# Patient Record
Sex: FEMALE | Race: ASIAN | NOT HISPANIC OR LATINO
[De-identification: names, ages, dates, MRNs, and addresses within clinical notes are randomized per-mention and may not be internally consistent; named-entity substitution may affect disease eponyms.]

---

## 2023-07-03 PROBLEM — Z00.00 ENCOUNTER FOR PREVENTIVE HEALTH EXAMINATION: Status: ACTIVE | Noted: 2023-07-03

## 2023-07-07 ENCOUNTER — APPOINTMENT (OUTPATIENT)
Dept: OBGYN | Facility: CLINIC | Age: 23
End: 2023-07-07

## 2023-07-12 ENCOUNTER — APPOINTMENT (OUTPATIENT)
Dept: OBGYN | Facility: CLINIC | Age: 23
End: 2023-07-12
Payer: MEDICAID

## 2023-07-12 VITALS
SYSTOLIC BLOOD PRESSURE: 110 MMHG | BODY MASS INDEX: 23.66 KG/M2 | TEMPERATURE: 97.5 F | DIASTOLIC BLOOD PRESSURE: 70 MMHG | HEIGHT: 65 IN | WEIGHT: 142 LBS | HEART RATE: 73 BPM | OXYGEN SATURATION: 97 %

## 2023-07-12 LAB
HCG UR QL: NEGATIVE
QUALITY CONTROL: YES

## 2023-07-12 PROCEDURE — 99203 OFFICE O/P NEW LOW 30 MIN: CPT

## 2023-07-13 ENCOUNTER — NON-APPOINTMENT (OUTPATIENT)
Age: 23
End: 2023-07-13

## 2023-07-13 ENCOUNTER — APPOINTMENT (OUTPATIENT)
Dept: OBGYN | Facility: CLINIC | Age: 23
End: 2023-07-13

## 2023-07-15 ENCOUNTER — TRANSCRIPTION ENCOUNTER (OUTPATIENT)
Age: 23
End: 2023-07-15

## 2023-07-17 ENCOUNTER — APPOINTMENT (OUTPATIENT)
Dept: GYNECOLOGIC ONCOLOGY | Facility: CLINIC | Age: 23
End: 2023-07-17
Payer: MEDICAID

## 2023-07-17 VITALS
DIASTOLIC BLOOD PRESSURE: 73 MMHG | HEIGHT: 69 IN | SYSTOLIC BLOOD PRESSURE: 117 MMHG | BODY MASS INDEX: 21.03 KG/M2 | HEART RATE: 87 BPM | WEIGHT: 142 LBS

## 2023-07-17 DIAGNOSIS — Z80.41 FAMILY HISTORY OF MALIGNANT NEOPLASM OF OVARY: ICD-10-CM

## 2023-07-17 PROCEDURE — 99205 OFFICE O/P NEW HI 60 MIN: CPT

## 2023-07-17 NOTE — DISCUSSION/SUMMARY
[FreeTextEntry1] : 23 year old with complex adnexal mass\par \par I discussed management options with Ms. ASCENCIO in detail.  We discussed that ovarian malignancy can only be diagnosed definitively after surgical excision.  Options of management include surgical excision versus continued close radiographic surveillance.  The CA-125 is normal.  Germ cell markers are normal. We discussed the limitations of tumor markers.  Risks of surgery include bleeding/blood transfusion/infection/injury to bowel/bladder/ureter/blood vessels.  Risks of observation include disease progression.  \par \par I answered her questions to her apparent satisfaction.  She would like to proceed with surgery.  The plan will be for exlap resection of affected ovary with possible staging.  Will review images from recent MRI with radiology.  Discussed rationale for open surgery given importance of intact specimen removal.  If not suspicious, can consider laparoscopic approach with controlled drainage of the mass.  Postoperative expectations and recovery were discussed in detail. Surgery will be scheduled in near future.

## 2023-07-17 NOTE — PHYSICAL EXAM
[Chaperone Present] : A chaperone was present in the examining room during all aspects of the physical examination [Abnormal] : Abdomen: Abnormal [Normal] : No focal neurologic defects observed [de-identified] : mass palpable inferior to umbilicus

## 2023-07-17 NOTE — HISTORY OF PRESENT ILLNESS
[FreeTextEntry1] : Referred by Dr. Franklin Herzog\par \par Ms. Sanchez is a virginal 23 year old female referred by Dr. Millicent Herzog, for a large pelvic mass.  She presented to Dr. Millicent Herzog with irregular menses.  Workup included a pelvic sonogram which revealed a large complex pelvic mass measuring 17.8 x 15cm and small ascites.  Further workup included a MRI pelvis with a 17.3cm complex solid cystic lesion, likely right adnexal in origin.\par \par She also saw Dr. Sheriff last week for consultation.  Tumor markers including , CEA, HCG, LDH, AFP, , inhibin , and ANUJ all normal.  \par \par Family hx of cancer- mother with ovarian cancer, genetic testing was negative, s/p debulking and chemotherapy diagnosed in 2017 ([facility])\par \par She has irregular bleeding.  She denies abdominal/pelvic pain, dyspnea or chest pain, nausea/vomiting, changes in bowel habits or urination, lower extremity edema or pain.  She denies all other associated signs and symptoms.  She is here to discuss further management.

## 2023-07-19 ENCOUNTER — OUTPATIENT (OUTPATIENT)
Dept: OUTPATIENT SERVICES | Facility: HOSPITAL | Age: 23
LOS: 1 days | End: 2023-07-19

## 2023-07-19 VITALS
TEMPERATURE: 99 F | RESPIRATION RATE: 16 BRPM | HEART RATE: 76 BPM | OXYGEN SATURATION: 99 % | HEIGHT: 69 IN | SYSTOLIC BLOOD PRESSURE: 121 MMHG | WEIGHT: 145.06 LBS | DIASTOLIC BLOOD PRESSURE: 77 MMHG

## 2023-07-19 DIAGNOSIS — N83.299 OTHER OVARIAN CYST, UNSPECIFIED SIDE: ICD-10-CM

## 2023-07-19 LAB
ANION GAP SERPL CALC-SCNC: 11 MMOL/L — SIGNIFICANT CHANGE UP (ref 7–14)
BLD GP AB SCN SERPL QL: NEGATIVE — SIGNIFICANT CHANGE UP
BUN SERPL-MCNC: 13 MG/DL — SIGNIFICANT CHANGE UP (ref 7–23)
CALCIUM SERPL-MCNC: 9.9 MG/DL — SIGNIFICANT CHANGE UP (ref 8.4–10.5)
CHLORIDE SERPL-SCNC: 100 MMOL/L — SIGNIFICANT CHANGE UP (ref 98–107)
CO2 SERPL-SCNC: 28 MMOL/L — SIGNIFICANT CHANGE UP (ref 22–31)
CREAT SERPL-MCNC: 0.69 MG/DL — SIGNIFICANT CHANGE UP (ref 0.5–1.3)
EGFR: 127 ML/MIN/1.73M2 — SIGNIFICANT CHANGE UP
GLUCOSE SERPL-MCNC: 98 MG/DL — SIGNIFICANT CHANGE UP (ref 70–99)
HCT VFR BLD CALC: 41.8 % — SIGNIFICANT CHANGE UP (ref 34.5–45)
HGB BLD-MCNC: 13.5 G/DL — SIGNIFICANT CHANGE UP (ref 11.5–15.5)
MCHC RBC-ENTMCNC: 30.6 PG — SIGNIFICANT CHANGE UP (ref 27–34)
MCHC RBC-ENTMCNC: 32.3 GM/DL — SIGNIFICANT CHANGE UP (ref 32–36)
MCV RBC AUTO: 94.8 FL — SIGNIFICANT CHANGE UP (ref 80–100)
NRBC # BLD: 0 /100 WBCS — SIGNIFICANT CHANGE UP (ref 0–0)
NRBC # FLD: 0 K/UL — SIGNIFICANT CHANGE UP (ref 0–0)
PLATELET # BLD AUTO: 275 K/UL — SIGNIFICANT CHANGE UP (ref 150–400)
POTASSIUM SERPL-MCNC: 4.2 MMOL/L — SIGNIFICANT CHANGE UP (ref 3.5–5.3)
POTASSIUM SERPL-SCNC: 4.2 MMOL/L — SIGNIFICANT CHANGE UP (ref 3.5–5.3)
RBC # BLD: 4.41 M/UL — SIGNIFICANT CHANGE UP (ref 3.8–5.2)
RBC # FLD: 11.3 % — SIGNIFICANT CHANGE UP (ref 10.3–14.5)
RH IG SCN BLD-IMP: POSITIVE — SIGNIFICANT CHANGE UP
SODIUM SERPL-SCNC: 139 MMOL/L — SIGNIFICANT CHANGE UP (ref 135–145)
WBC # BLD: 5.6 K/UL — SIGNIFICANT CHANGE UP (ref 3.8–10.5)
WBC # FLD AUTO: 5.6 K/UL — SIGNIFICANT CHANGE UP (ref 3.8–10.5)

## 2023-07-19 RX ORDER — SODIUM CHLORIDE 9 MG/ML
3 INJECTION INTRAMUSCULAR; INTRAVENOUS; SUBCUTANEOUS EVERY 8 HOURS
Refills: 0 | Status: DISCONTINUED | OUTPATIENT
Start: 2023-07-27 | End: 2023-07-29

## 2023-07-19 NOTE — H&P PST ADULT - NSICDXFAMILYHX_GEN_ALL_CORE_FT
FAMILY HISTORY:  Father  Still living? Unknown  FH: thyroid cancer, Age at diagnosis: Age Unknown    Mother  Still living? Unknown  FH: ovarian cancer, Age at diagnosis: Age Unknown

## 2023-07-19 NOTE — H&P PST ADULT - PROBLEM SELECTOR PLAN 1
Pre-op instructions provided. Pt verbalized understanding.   Pepcid provided for GI prophylaxis.   Pt given detailed verbal and written instructions on chlorhexidine wash. Pt verbalized understanding with teachback.   Pt given urine specimen cup for ucg on admission.   Recent HCG in chart, CBC, BMP, T&S done at PST.

## 2023-07-19 NOTE — H&P PST ADULT - HISTORY OF PRESENT ILLNESS
23 year old female with c/o irregular periods, found to have pelvic mass on u/s. Pt went for f/u MRI. Pt presents today for presurgical evaluation for... 23 year old female with c/o irregular periods, found to have pelvic mass on u/s. Pt went for f/u MRI. Pt presents today for presurgical evaluation for Exploratory Resection of Pelvic Mass, Possible Staging.

## 2023-07-26 ENCOUNTER — TRANSCRIPTION ENCOUNTER (OUTPATIENT)
Age: 23
End: 2023-07-26

## 2023-07-27 ENCOUNTER — RESULT REVIEW (OUTPATIENT)
Age: 23
End: 2023-07-27

## 2023-07-27 ENCOUNTER — INPATIENT (INPATIENT)
Facility: HOSPITAL | Age: 23
LOS: 1 days | Discharge: HOME | End: 2023-07-29
Attending: OBSTETRICS & GYNECOLOGY | Admitting: OBSTETRICS & GYNECOLOGY
Payer: MEDICAID

## 2023-07-27 ENCOUNTER — TRANSCRIPTION ENCOUNTER (OUTPATIENT)
Age: 23
End: 2023-07-27

## 2023-07-27 ENCOUNTER — APPOINTMENT (OUTPATIENT)
Dept: GYNECOLOGIC ONCOLOGY | Facility: HOSPITAL | Age: 23
End: 2023-07-27

## 2023-07-27 VITALS
WEIGHT: 145.06 LBS | DIASTOLIC BLOOD PRESSURE: 79 MMHG | SYSTOLIC BLOOD PRESSURE: 117 MMHG | OXYGEN SATURATION: 99 % | RESPIRATION RATE: 16 BRPM | HEIGHT: 69 IN | HEART RATE: 81 BPM | TEMPERATURE: 99 F

## 2023-07-27 DIAGNOSIS — N83.299 OTHER OVARIAN CYST, UNSPECIFIED SIDE: ICD-10-CM

## 2023-07-27 LAB
HCG UR QL: NEGATIVE — SIGNIFICANT CHANGE UP
RH IG SCN BLD-IMP: POSITIVE — SIGNIFICANT CHANGE UP

## 2023-07-27 PROCEDURE — 88342 IMHCHEM/IMCYTCHM 1ST ANTB: CPT | Mod: 26,59

## 2023-07-27 PROCEDURE — 88332 PATH CONSLTJ SURG EA ADD BLK: CPT | Mod: 26

## 2023-07-27 PROCEDURE — 88341 IMHCHEM/IMCYTCHM EA ADD ANTB: CPT | Mod: 26,59

## 2023-07-27 PROCEDURE — 88305 TISSUE EXAM BY PATHOLOGIST: CPT | Mod: 26

## 2023-07-27 PROCEDURE — 88360 TUMOR IMMUNOHISTOCHEM/MANUAL: CPT | Mod: 26

## 2023-07-27 PROCEDURE — 88331 PATH CONSLTJ SURG 1 BLK 1SPC: CPT | Mod: 26

## 2023-07-27 PROCEDURE — 88112 CYTOPATH CELL ENHANCE TECH: CPT | Mod: 26

## 2023-07-27 PROCEDURE — 49205: CPT

## 2023-07-27 DEVICE — LIGATING CLIPS WECK HORIZON MEDIUM (BLUE) 24: Type: IMPLANTABLE DEVICE | Status: FUNCTIONAL

## 2023-07-27 DEVICE — SEPRAFILM 5 X 6": Type: IMPLANTABLE DEVICE | Status: FUNCTIONAL

## 2023-07-27 DEVICE — LIGATING CLIPS WECK HORIZON LARGE (ORANGE) 24: Type: IMPLANTABLE DEVICE | Status: FUNCTIONAL

## 2023-07-27 DEVICE — INTERCEED 5 X 6" XL: Type: IMPLANTABLE DEVICE | Status: FUNCTIONAL

## 2023-07-27 RX ORDER — SODIUM CHLORIDE 9 MG/ML
1000 INJECTION, SOLUTION INTRAVENOUS
Refills: 0 | Status: DISCONTINUED | OUTPATIENT
Start: 2023-07-27 | End: 2023-07-27

## 2023-07-27 RX ORDER — OXYCODONE HYDROCHLORIDE 5 MG/1
5 TABLET ORAL EVERY 6 HOURS
Refills: 0 | Status: DISCONTINUED | OUTPATIENT
Start: 2023-07-27 | End: 2023-07-29

## 2023-07-27 RX ORDER — OXYCODONE HYDROCHLORIDE 5 MG/1
2.5 TABLET ORAL EVERY 6 HOURS
Refills: 0 | Status: DISCONTINUED | OUTPATIENT
Start: 2023-07-27 | End: 2023-07-29

## 2023-07-27 RX ORDER — OXYCODONE HYDROCHLORIDE 5 MG/1
5 TABLET ORAL ONCE
Refills: 0 | Status: DISCONTINUED | OUTPATIENT
Start: 2023-07-27 | End: 2023-07-27

## 2023-07-27 RX ORDER — SENNA PLUS 8.6 MG/1
2 TABLET ORAL AT BEDTIME
Refills: 0 | Status: DISCONTINUED | OUTPATIENT
Start: 2023-07-27 | End: 2023-07-29

## 2023-07-27 RX ORDER — SIMETHICONE 80 MG/1
80 TABLET, CHEWABLE ORAL EVERY 6 HOURS
Refills: 0 | Status: DISCONTINUED | OUTPATIENT
Start: 2023-07-27 | End: 2023-07-28

## 2023-07-27 RX ORDER — KETOROLAC TROMETHAMINE 30 MG/ML
30 SYRINGE (ML) INJECTION EVERY 6 HOURS
Refills: 0 | Status: DISCONTINUED | OUTPATIENT
Start: 2023-07-27 | End: 2023-07-28

## 2023-07-27 RX ORDER — ONDANSETRON 8 MG/1
4 TABLET, FILM COATED ORAL EVERY 4 HOURS
Refills: 0 | Status: DISCONTINUED | OUTPATIENT
Start: 2023-07-27 | End: 2023-07-29

## 2023-07-27 RX ORDER — FENTANYL CITRATE 50 UG/ML
50 INJECTION INTRAVENOUS
Refills: 0 | Status: DISCONTINUED | OUTPATIENT
Start: 2023-07-27 | End: 2023-07-27

## 2023-07-27 RX ORDER — HYDROMORPHONE HYDROCHLORIDE 2 MG/ML
1 INJECTION INTRAMUSCULAR; INTRAVENOUS; SUBCUTANEOUS
Refills: 0 | Status: DISCONTINUED | OUTPATIENT
Start: 2023-07-27 | End: 2023-07-27

## 2023-07-27 RX ORDER — HEPARIN SODIUM 5000 [USP'U]/ML
5000 INJECTION INTRAVENOUS; SUBCUTANEOUS EVERY 8 HOURS
Refills: 0 | Status: DISCONTINUED | OUTPATIENT
Start: 2023-07-27 | End: 2023-07-28

## 2023-07-27 RX ORDER — SODIUM CHLORIDE 9 MG/ML
1000 INJECTION, SOLUTION INTRAVENOUS
Refills: 0 | Status: DISCONTINUED | OUTPATIENT
Start: 2023-07-27 | End: 2023-07-28

## 2023-07-27 RX ORDER — ACETAMINOPHEN 500 MG
1000 TABLET ORAL EVERY 6 HOURS
Refills: 0 | Status: DISCONTINUED | OUTPATIENT
Start: 2023-07-27 | End: 2023-07-28

## 2023-07-27 RX ORDER — ONDANSETRON 8 MG/1
4 TABLET, FILM COATED ORAL ONCE
Refills: 0 | Status: DISCONTINUED | OUTPATIENT
Start: 2023-07-27 | End: 2023-07-27

## 2023-07-27 RX ADMIN — SODIUM CHLORIDE 125 MILLILITER(S): 9 INJECTION, SOLUTION INTRAVENOUS at 11:31

## 2023-07-27 RX ADMIN — Medication 1000 MILLIGRAM(S): at 16:10

## 2023-07-27 RX ADMIN — Medication 1000 MILLIGRAM(S): at 22:35

## 2023-07-27 RX ADMIN — SENNA PLUS 2 TABLET(S): 8.6 TABLET ORAL at 22:05

## 2023-07-27 RX ADMIN — Medication 30 MILLIGRAM(S): at 15:59

## 2023-07-27 RX ADMIN — OXYCODONE HYDROCHLORIDE 5 MILLIGRAM(S): 5 TABLET ORAL at 20:27

## 2023-07-27 RX ADMIN — Medication 400 MILLIGRAM(S): at 15:37

## 2023-07-27 RX ADMIN — SODIUM CHLORIDE 125 MILLILITER(S): 9 INJECTION, SOLUTION INTRAVENOUS at 15:59

## 2023-07-27 RX ADMIN — OXYCODONE HYDROCHLORIDE 5 MILLIGRAM(S): 5 TABLET ORAL at 17:47

## 2023-07-27 RX ADMIN — HEPARIN SODIUM 5000 UNIT(S): 5000 INJECTION INTRAVENOUS; SUBCUTANEOUS at 16:00

## 2023-07-27 RX ADMIN — HEPARIN SODIUM 5000 UNIT(S): 5000 INJECTION INTRAVENOUS; SUBCUTANEOUS at 22:05

## 2023-07-27 RX ADMIN — Medication 30 MILLIGRAM(S): at 23:15

## 2023-07-27 RX ADMIN — SODIUM CHLORIDE 3 MILLILITER(S): 9 INJECTION INTRAMUSCULAR; INTRAVENOUS; SUBCUTANEOUS at 22:49

## 2023-07-27 RX ADMIN — Medication 30 MILLIGRAM(S): at 22:46

## 2023-07-27 RX ADMIN — Medication 400 MILLIGRAM(S): at 22:05

## 2023-07-27 RX ADMIN — OXYCODONE HYDROCHLORIDE 5 MILLIGRAM(S): 5 TABLET ORAL at 21:27

## 2023-07-27 RX ADMIN — OXYCODONE HYDROCHLORIDE 5 MILLIGRAM(S): 5 TABLET ORAL at 16:47

## 2023-07-27 RX ADMIN — SODIUM CHLORIDE 3 MILLILITER(S): 9 INJECTION INTRAMUSCULAR; INTRAVENOUS; SUBCUTANEOUS at 14:12

## 2023-07-27 NOTE — BRIEF OPERATIVE NOTE - NSICDXBRIEFPROCEDURE_GEN_ALL_CORE_FT
PROCEDURES:  Right salpingoophorectomy 27-Jul-2023 10:46:30 Ex Lap Kaley Gomez  Pelvic examination under anesthesia 27-Jul-2023 10:46:47  Kaley Gomez

## 2023-07-27 NOTE — PATIENT PROFILE ADULT - FALL HARM RISK - HARM RISK INTERVENTIONS

## 2023-07-27 NOTE — BRIEF OPERATIVE NOTE - OPERATION/FINDINGS
Normal ecte Normal external female genitalia, uterus could not be appreciated. Large mass appreciated 2 cm under the level of the umbilicus   Upon entry into the abdomen there was a large cystic mass arising from the right adnexa. Normal appearing uterus, bilateral fallopian tubes and left ovary. Ascites collected and sent to cytology. Appendix, liver edge and omentum wnl.   Frozen= mucinous border line 2 Solid components noted   Interceed and Seprafilm used Normal external female genitalia, uterus could not be appreciated. Large mass appreciated to the level of the umbilicus   Upon entry into the abdomen there was a large cystic mass arising from the right adnexa. Normal appearing uterus, bilateral fallopian tubes and left ovary. Ascites collected and sent to cytology. Appendix, liver edge and omentum wnl.   Frozen= mucinous border line 2 Solid components noted   Interceed and Seprafilm used

## 2023-07-28 ENCOUNTER — TRANSCRIPTION ENCOUNTER (OUTPATIENT)
Age: 23
End: 2023-07-28

## 2023-07-28 DIAGNOSIS — Z98.890 OTHER SPECIFIED POSTPROCEDURAL STATES: ICD-10-CM

## 2023-07-28 LAB
ANION GAP SERPL CALC-SCNC: 8 MMOL/L — SIGNIFICANT CHANGE UP (ref 7–14)
BASOPHILS # BLD AUTO: 0.02 K/UL — SIGNIFICANT CHANGE UP (ref 0–0.2)
BASOPHILS NFR BLD AUTO: 0.2 % — SIGNIFICANT CHANGE UP (ref 0–2)
BUN SERPL-MCNC: 8 MG/DL — SIGNIFICANT CHANGE UP (ref 7–23)
CALCIUM SERPL-MCNC: 8.6 MG/DL — SIGNIFICANT CHANGE UP (ref 8.4–10.5)
CHLORIDE SERPL-SCNC: 104 MMOL/L — SIGNIFICANT CHANGE UP (ref 98–107)
CO2 SERPL-SCNC: 27 MMOL/L — SIGNIFICANT CHANGE UP (ref 22–31)
CREAT SERPL-MCNC: 0.57 MG/DL — SIGNIFICANT CHANGE UP (ref 0.5–1.3)
EGFR: 133 ML/MIN/1.73M2 — SIGNIFICANT CHANGE UP
EOSINOPHIL # BLD AUTO: 0.02 K/UL — SIGNIFICANT CHANGE UP (ref 0–0.5)
EOSINOPHIL NFR BLD AUTO: 0.2 % — SIGNIFICANT CHANGE UP (ref 0–6)
GLUCOSE SERPL-MCNC: 89 MG/DL — SIGNIFICANT CHANGE UP (ref 70–99)
HCT VFR BLD CALC: 35.3 % — SIGNIFICANT CHANGE UP (ref 34.5–45)
HGB BLD-MCNC: 11.5 G/DL — SIGNIFICANT CHANGE UP (ref 11.5–15.5)
IANC: 8.65 K/UL — HIGH (ref 1.8–7.4)
IMM GRANULOCYTES NFR BLD AUTO: 0.3 % — SIGNIFICANT CHANGE UP (ref 0–0.9)
LYMPHOCYTES # BLD AUTO: 19.2 % — SIGNIFICANT CHANGE UP (ref 13–44)
LYMPHOCYTES # BLD AUTO: 2.28 K/UL — SIGNIFICANT CHANGE UP (ref 1–3.3)
MAGNESIUM SERPL-MCNC: 2 MG/DL — SIGNIFICANT CHANGE UP (ref 1.6–2.6)
MCHC RBC-ENTMCNC: 30.5 PG — SIGNIFICANT CHANGE UP (ref 27–34)
MCHC RBC-ENTMCNC: 32.6 GM/DL — SIGNIFICANT CHANGE UP (ref 32–36)
MCV RBC AUTO: 93.6 FL — SIGNIFICANT CHANGE UP (ref 80–100)
MONOCYTES # BLD AUTO: 0.9 K/UL — SIGNIFICANT CHANGE UP (ref 0–0.9)
MONOCYTES NFR BLD AUTO: 7.6 % — SIGNIFICANT CHANGE UP (ref 2–14)
NEUTROPHILS # BLD AUTO: 8.65 K/UL — HIGH (ref 1.8–7.4)
NEUTROPHILS NFR BLD AUTO: 72.5 % — SIGNIFICANT CHANGE UP (ref 43–77)
NRBC # BLD: 0 /100 WBCS — SIGNIFICANT CHANGE UP (ref 0–0)
NRBC # FLD: 0 K/UL — SIGNIFICANT CHANGE UP (ref 0–0)
PHOSPHATE SERPL-MCNC: 4.3 MG/DL — SIGNIFICANT CHANGE UP (ref 2.5–4.5)
PLATELET # BLD AUTO: 231 K/UL — SIGNIFICANT CHANGE UP (ref 150–400)
POTASSIUM SERPL-MCNC: 3.6 MMOL/L — SIGNIFICANT CHANGE UP (ref 3.5–5.3)
POTASSIUM SERPL-SCNC: 3.6 MMOL/L — SIGNIFICANT CHANGE UP (ref 3.5–5.3)
RBC # BLD: 3.77 M/UL — LOW (ref 3.8–5.2)
RBC # FLD: 11.6 % — SIGNIFICANT CHANGE UP (ref 10.3–14.5)
SODIUM SERPL-SCNC: 139 MMOL/L — SIGNIFICANT CHANGE UP (ref 135–145)
WBC # BLD: 11.9 K/UL — HIGH (ref 3.8–10.5)
WBC # FLD AUTO: 11.9 K/UL — HIGH (ref 3.8–10.5)

## 2023-07-28 RX ORDER — ACETAMINOPHEN 500 MG
975 TABLET ORAL EVERY 6 HOURS
Refills: 0 | Status: DISCONTINUED | OUTPATIENT
Start: 2023-07-28 | End: 2023-07-29

## 2023-07-28 RX ORDER — OXYCODONE HYDROCHLORIDE 5 MG/1
1 TABLET ORAL
Qty: 10 | Refills: 0
Start: 2023-07-28

## 2023-07-28 RX ORDER — IBUPROFEN 200 MG
600 TABLET ORAL EVERY 6 HOURS
Refills: 0 | Status: DISCONTINUED | OUTPATIENT
Start: 2023-07-28 | End: 2023-07-29

## 2023-07-28 RX ORDER — SIMETHICONE 80 MG/1
80 TABLET, CHEWABLE ORAL
Refills: 0 | Status: DISCONTINUED | OUTPATIENT
Start: 2023-07-28 | End: 2023-07-29

## 2023-07-28 RX ORDER — ENOXAPARIN SODIUM 100 MG/ML
40 INJECTION SUBCUTANEOUS EVERY 24 HOURS
Refills: 0 | Status: DISCONTINUED | OUTPATIENT
Start: 2023-07-28 | End: 2023-07-29

## 2023-07-28 RX ORDER — POTASSIUM CHLORIDE 20 MEQ
40 PACKET (EA) ORAL EVERY 4 HOURS
Refills: 0 | Status: COMPLETED | OUTPATIENT
Start: 2023-07-28 | End: 2023-07-28

## 2023-07-28 RX ORDER — OXYCODONE HYDROCHLORIDE 5 MG/1
1 TABLET ORAL
Qty: 5 | Refills: 0
Start: 2023-07-28

## 2023-07-28 RX ADMIN — SODIUM CHLORIDE 3 MILLILITER(S): 9 INJECTION INTRAMUSCULAR; INTRAVENOUS; SUBCUTANEOUS at 21:39

## 2023-07-28 RX ADMIN — ENOXAPARIN SODIUM 40 MILLIGRAM(S): 100 INJECTION SUBCUTANEOUS at 13:40

## 2023-07-28 RX ADMIN — OXYCODONE HYDROCHLORIDE 5 MILLIGRAM(S): 5 TABLET ORAL at 13:40

## 2023-07-28 RX ADMIN — Medication 600 MILLIGRAM(S): at 22:51

## 2023-07-28 RX ADMIN — Medication 40 MILLIEQUIVALENT(S): at 10:45

## 2023-07-28 RX ADMIN — Medication 30 MILLIGRAM(S): at 17:10

## 2023-07-28 RX ADMIN — Medication 30 MILLIGRAM(S): at 05:33

## 2023-07-28 RX ADMIN — OXYCODONE HYDROCHLORIDE 5 MILLIGRAM(S): 5 TABLET ORAL at 08:30

## 2023-07-28 RX ADMIN — Medication 40 MILLIEQUIVALENT(S): at 13:40

## 2023-07-28 RX ADMIN — SODIUM CHLORIDE 3 MILLILITER(S): 9 INJECTION INTRAMUSCULAR; INTRAVENOUS; SUBCUTANEOUS at 13:31

## 2023-07-28 RX ADMIN — OXYCODONE HYDROCHLORIDE 5 MILLIGRAM(S): 5 TABLET ORAL at 20:46

## 2023-07-28 RX ADMIN — OXYCODONE HYDROCHLORIDE 5 MILLIGRAM(S): 5 TABLET ORAL at 14:30

## 2023-07-28 RX ADMIN — Medication 400 MILLIGRAM(S): at 03:57

## 2023-07-28 RX ADMIN — HEPARIN SODIUM 5000 UNIT(S): 5000 INJECTION INTRAVENOUS; SUBCUTANEOUS at 05:34

## 2023-07-28 RX ADMIN — OXYCODONE HYDROCHLORIDE 5 MILLIGRAM(S): 5 TABLET ORAL at 19:46

## 2023-07-28 RX ADMIN — SIMETHICONE 80 MILLIGRAM(S): 80 TABLET, CHEWABLE ORAL at 08:45

## 2023-07-28 RX ADMIN — Medication 975 MILLIGRAM(S): at 12:32

## 2023-07-28 RX ADMIN — Medication 30 MILLIGRAM(S): at 06:03

## 2023-07-28 RX ADMIN — Medication 975 MILLIGRAM(S): at 17:54

## 2023-07-28 RX ADMIN — SODIUM CHLORIDE 3 MILLILITER(S): 9 INJECTION INTRAMUSCULAR; INTRAVENOUS; SUBCUTANEOUS at 05:33

## 2023-07-28 RX ADMIN — Medication 1000 MILLIGRAM(S): at 04:30

## 2023-07-28 RX ADMIN — Medication 30 MILLIGRAM(S): at 11:10

## 2023-07-28 RX ADMIN — Medication 30 MILLIGRAM(S): at 10:45

## 2023-07-28 RX ADMIN — Medication 975 MILLIGRAM(S): at 12:44

## 2023-07-28 RX ADMIN — OXYCODONE HYDROCHLORIDE 5 MILLIGRAM(S): 5 TABLET ORAL at 07:33

## 2023-07-28 RX ADMIN — Medication 975 MILLIGRAM(S): at 18:25

## 2023-07-28 RX ADMIN — Medication 30 MILLIGRAM(S): at 17:57

## 2023-07-28 RX ADMIN — Medication 600 MILLIGRAM(S): at 21:51

## 2023-07-28 NOTE — DISCHARGE NOTE PROVIDER - HOSPITAL COURSE
23y yo F s/p Ex Lap Right Salpingo-Oophorectomy (Frozen=Mucinous Borderline)  on 7/27. See operative report for full details. EBL: 10 Hct:41.8->35.3    POD #0, pt was advanced to a regular diet  POD#1, hong was discontinued and pt was able to void spontaneously.     pt was discharged in stable condition, ambulating, tolerating po and voiding spontaneously. Pt to have close follow-up w/ Dr. Mendez in office 23y yo F s/p Ex Lap Right Salpingo-Oophorectomy (Frozen=Mucinous Borderline)  on 7/27. See operative report for full details. EBL: 10 Hct:41.8->35.3    POD #0, pt was advanced to a regular diet  POD#1, hong was discontinued and pt was able to void spontaneously.   POD #3 meeting all milestones postoperatively, passing flatus, tolerating regular diet, ambulating.     pt was discharged in stable condition, ambulating, tolerating po and voiding spontaneously. Pt to have close follow-up w/ Dr. Mendez in office

## 2023-07-28 NOTE — DISCHARGE NOTE PROVIDER - NSDCMRMEDTOKEN_GEN_ALL_CORE_FT
Motrin 600 mg oral tablet: 1 orally every 6 hours  oxyCODONE 5 mg oral tablet: 1 tab(s) orally every 6 hours MDD: 4 PILLS  Tylenol 325 mg oral tablet: 3 orally every 6 hours   Motrin 600 mg oral tablet: 1 orally every 6 hours  oxyCODONE 5 mg oral tablet: 1 tab(s) orally every 6 hours as needed for  severe pain MDD: 4 PILLS  Tylenol 325 mg oral tablet: 3 orally every 6 hours   ibuprofen 600 mg oral tablet: 1 tab(s) orally every 6 hours as needed for  moderate pain  oxyCODONE 5 mg oral tablet: 1 tab(s) orally every 6 hours as needed for  severe pain MDD: 4 PILLS  simethicone 80 mg oral tablet, chewable: 1 tab(s) orally 5 times a day as needed for gas relef  Tylenol 325 mg oral tablet: 3 tablet orally every 6 hours as needed for  mild pain

## 2023-07-28 NOTE — CHART NOTE - NSCHARTNOTEFT_GEN_A_CORE
PA GYN ONC PM Rounds    Patient seen at bedside.  No acute complaints.  Pain is controlled with medications. Patient walked the hallway and around the room a little. Patient is having difficulty with ambulation 2/2 to discomfort  Patient is tolerating regular diet, is voiding spontaneously, and is ambulating.  Patient has not passed flatus.  Plan is unchanged since this AM. Labs ordered for am    Vital Signs Last 24 Hrs  T(C): 36.8 (28 Jul 2023 13:52), Max: 37.3 (27 Jul 2023 17:02)  T(F): 98.2 (28 Jul 2023 13:52), Max: 99.2 (27 Jul 2023 17:02)  HR: 82 (28 Jul 2023 13:52) (76 - 91)  BP: 104/63 (28 Jul 2023 13:52) (104/63 - 110/69)  RR: 15 (28 Jul 2023 13:52) (15 - 20)  SpO2: 99% (28 Jul 2023 13:52) (96% - 99%)    Parameters below as of 28 Jul 2023 13:52  Patient On (Oxygen Delivery Method): room air      Radha Bolanos PAC  [id number]
PA POST OP NOTE:       SUBJECTIVE:  Patient seen and examined at bedside. Patient was asleep but aroused easily. Reports mild incisional discomfort at this time. Denies c/o nausea, vomiting, sob, cp or palpitations. Eliseo sips of water. Bergman in place.    Vital Signs Last 24 Hrs  T(C): 37 (27 Jul 2023 12:00), Max: 37.1 (27 Jul 2023 06:07)  T(F): 98.6 (27 Jul 2023 12:00), Max: 98.8 (27 Jul 2023 06:07)  HR: 80 (27 Jul 2023 12:30) (73 - 88)  BP: 97/67 (27 Jul 2023 12:30) (97/67 - 117/79)  BP(mean): 73 (27 Jul 2023 12:30) (67 - 76)  RR: 13 (27 Jul 2023 12:30) (12 - 16)  SpO2: 94% (27 Jul 2023 12:30) (94% - 100%)    Parameters below as of 27 Jul 2023 11:45  Patient On (Oxygen Delivery Method): room air          PHYSICAL EXAM:    LUNGS: Clear to auscultation bilaterally; No wheezing.  HEART: Regular, rate and rhythm; No murmurs.  ABDOMEN: Soft, decreased BS, nondistended and appropriately tender on palpation.  INCISION:  Vertical inc with dermabond dressing intact, clean and dry. Abdominal binder on.  EXTREMITIES: No swelling or calf tenderness bilaterally. Venodynes in place.  BERGMAN: Urine clear.         MEDICATIONS  (STANDING):  acetaminophen   IVPB .. 1000 milliGRAM(s) IV Intermittent every 6 hours  heparin   Injectable 5000 Unit(s) SubCutaneous every 8 hours  ketorolac   Injectable 30 milliGRAM(s) IV Push every 6 hours  lactated ringers. 1000 milliLiter(s) (125 mL/Hr) IV Continuous <Continuous>  sodium chloride 0.9% lock flush 3 milliLiter(s) IV Push every 8 hours    MEDICATIONS  (PRN):  fentaNYL    Injectable 50 MICROGram(s) IV Push every 5 minutes PRN Moderate Pain (4 - 6)  HYDROmorphone  Injectable 1 milliGRAM(s) IV Push every 10 minutes PRN Severe Pain (7 - 10)  ondansetron Injectable 4 milliGRAM(s) IV Push once PRN Nausea and/or Vomiting  ondansetron Injectable 4 milliGRAM(s) IV Push every 4 hours PRN Nausea and/or Vomiting  oxyCODONE    IR 5 milliGRAM(s) Oral every 6 hours PRN Severe Pain (7 - 10)  oxyCODONE    IR 2.5 milliGRAM(s) Oral every 6 hours PRN Moderate Pain (4 - 6)  simethicone 80 milliGRAM(s) Chew every 6 hours PRN Gas      ASSESMENT/PLAN: 22y/o POD#0  from Exlap RSO in stable condition.    1.Clears to Regular diet as tolerate.  2. IVF: RL @125cc/hr.  3. Activity: Out of bed with assist.  4. Vital Signs Q routine.  5. Pulm:  pulse Ox monitoring and encourage incentive spirometer use.  6.  Pain meds as ordered.  7. LABS: CBC+BMP in AM.  8. Bergman to gravity. Eval for removal after AM labs resulted.  9. Continue Heparin SQ and Venodynes for DVT prophylaxis.  10. Admit to floor and continue routine post op care.

## 2023-07-28 NOTE — PROGRESS NOTE ADULT - ASSESSMENT
Assessment/Plan: 23y POD#1, s/p Ex-Lap Right Salpingo-Oophorectomy (Frozen =Borderline) . Recovering well     Neuro:Pain controlled with IV Tylenol, Toradol and Oxycodone. Consider transitioning to PO pain medication  CV: Hemodynamically stable. F/u AM labs   Pulm: Saturating well on RA. Increase incentive spirometry.  GI: Regular diet, encourage PO intake   : Spicer in place. Adequate UOP. D/c Spicer after AM labs   Heme: Continue HSQ/Venodynes for DVT ppx. Increase OOB.  Consider switching to Lovenox   ID: Afebrile  Endo:No issues   FEN:LR@125 . F/u BMP Replete electrolytes PRN   Dispo: continue inpatient care    Kaley Gomez, PGY-4

## 2023-07-28 NOTE — DISCHARGE NOTE PROVIDER - CARE PROVIDERS DIRECT ADDRESSES
[email or web address]

## 2023-07-28 NOTE — DISCHARGE NOTE PROVIDER - NSDCCPTREATMENT_GEN_ALL_CORE_FT
PRINCIPAL PROCEDURE  Procedure: Right salpingoophorectomy  Findings and Treatment: Ex Lap      SECONDARY PROCEDURE  Procedure: Pelvic examination under anesthesia  Findings and Treatment:

## 2023-07-28 NOTE — DISCHARGE NOTE PROVIDER - NSDCFUADDINST_GEN_ALL_CORE_FT
Return to your regular way of eating.  Resume normal activity as tolerated, but no heavy lifting or strenuous activity for 6 weeks.  No driving for next 2 weeks and/or while on narcotic pain medication.  Complete vaginal rest, no tampons, no douching, no tub bathing, no sexual activities for 6 weeks unless otherwise instructed by your doctor.  Call your doctor with any signs and symptoms of infection such as fever, chills, nausea or vomiting.  Call your doctor with redness or swelling at the incision site, fluid leakage or wound separation.  Call your doctor if you're unable to tolerate food or have difficulty urinating.  Call your doctor if you have pain that is not relieved by your prescribed medications.  Notify your doctor with any other concerns.  Follow up with Dr. Mendez on 8/11 for postoperative visit

## 2023-07-28 NOTE — DISCHARGE NOTE PROVIDER - CARE PROVIDER_API CALL
Tena Mendez  Gynecologic Oncology  [address] [place] [address]  Phone: [phone]  Fax: [phone]  Established Patient  Scheduled Appointment: 08/11/2023   Tena Mendez  Gynecologic Oncology  [address] [place] [address]  Phone: [phone]  Fax: [phone]  Established Patient  Scheduled Appointment: 08/11/2023   Tena Mendez  Gynecologic Oncology  [address] [place] [address]  Phone: [phone]  Fax: [phone]  Established Patient  Scheduled Appointment: 08/11/2023

## 2023-07-29 ENCOUNTER — TRANSCRIPTION ENCOUNTER (OUTPATIENT)
Age: 23
End: 2023-07-29

## 2023-07-29 VITALS — TEMPERATURE: 98 F

## 2023-07-29 LAB
ANION GAP SERPL CALC-SCNC: 16 MMOL/L — HIGH (ref 7–14)
BASOPHILS # BLD AUTO: 0.01 K/UL — SIGNIFICANT CHANGE UP (ref 0–0.2)
BASOPHILS NFR BLD AUTO: 0.1 % — SIGNIFICANT CHANGE UP (ref 0–2)
BUN SERPL-MCNC: 7 MG/DL — SIGNIFICANT CHANGE UP (ref 7–23)
CALCIUM SERPL-MCNC: 8.9 MG/DL — SIGNIFICANT CHANGE UP (ref 8.4–10.5)
CHLORIDE SERPL-SCNC: 103 MMOL/L — SIGNIFICANT CHANGE UP (ref 98–107)
CO2 SERPL-SCNC: 22 MMOL/L — SIGNIFICANT CHANGE UP (ref 22–31)
CREAT SERPL-MCNC: 0.56 MG/DL — SIGNIFICANT CHANGE UP (ref 0.5–1.3)
EGFR: 133 ML/MIN/1.73M2 — SIGNIFICANT CHANGE UP
EOSINOPHIL # BLD AUTO: 0.11 K/UL — SIGNIFICANT CHANGE UP (ref 0–0.5)
EOSINOPHIL NFR BLD AUTO: 1.4 % — SIGNIFICANT CHANGE UP (ref 0–6)
GLUCOSE SERPL-MCNC: 75 MG/DL — SIGNIFICANT CHANGE UP (ref 70–99)
HCT VFR BLD CALC: 34.7 % — SIGNIFICANT CHANGE UP (ref 34.5–45)
HGB BLD-MCNC: 11 G/DL — LOW (ref 11.5–15.5)
IANC: 4.63 K/UL — SIGNIFICANT CHANGE UP (ref 1.8–7.4)
IMM GRANULOCYTES NFR BLD AUTO: 0.3 % — SIGNIFICANT CHANGE UP (ref 0–0.9)
LYMPHOCYTES # BLD AUTO: 2.34 K/UL — SIGNIFICANT CHANGE UP (ref 1–3.3)
LYMPHOCYTES # BLD AUTO: 30.8 % — SIGNIFICANT CHANGE UP (ref 13–44)
MAGNESIUM SERPL-MCNC: 2 MG/DL — SIGNIFICANT CHANGE UP (ref 1.6–2.6)
MCHC RBC-ENTMCNC: 30.1 PG — SIGNIFICANT CHANGE UP (ref 27–34)
MCHC RBC-ENTMCNC: 31.7 GM/DL — LOW (ref 32–36)
MCV RBC AUTO: 94.8 FL — SIGNIFICANT CHANGE UP (ref 80–100)
MONOCYTES # BLD AUTO: 0.48 K/UL — SIGNIFICANT CHANGE UP (ref 0–0.9)
MONOCYTES NFR BLD AUTO: 6.3 % — SIGNIFICANT CHANGE UP (ref 2–14)
NEUTROPHILS # BLD AUTO: 4.63 K/UL — SIGNIFICANT CHANGE UP (ref 1.8–7.4)
NEUTROPHILS NFR BLD AUTO: 61.1 % — SIGNIFICANT CHANGE UP (ref 43–77)
NRBC # BLD: 0 /100 WBCS — SIGNIFICANT CHANGE UP (ref 0–0)
NRBC # FLD: 0 K/UL — SIGNIFICANT CHANGE UP (ref 0–0)
PHOSPHATE SERPL-MCNC: 4.1 MG/DL — SIGNIFICANT CHANGE UP (ref 2.5–4.5)
PLATELET # BLD AUTO: 214 K/UL — SIGNIFICANT CHANGE UP (ref 150–400)
POTASSIUM SERPL-MCNC: 4.1 MMOL/L — SIGNIFICANT CHANGE UP (ref 3.5–5.3)
POTASSIUM SERPL-SCNC: 4.1 MMOL/L — SIGNIFICANT CHANGE UP (ref 3.5–5.3)
RBC # BLD: 3.66 M/UL — LOW (ref 3.8–5.2)
RBC # FLD: 11.5 % — SIGNIFICANT CHANGE UP (ref 10.3–14.5)
SODIUM SERPL-SCNC: 141 MMOL/L — SIGNIFICANT CHANGE UP (ref 135–145)
WBC # BLD: 7.59 K/UL — SIGNIFICANT CHANGE UP (ref 3.8–10.5)
WBC # FLD AUTO: 7.59 K/UL — SIGNIFICANT CHANGE UP (ref 3.8–10.5)

## 2023-07-29 PROCEDURE — 99238 HOSP IP/OBS DSCHRG MGMT 30/<: CPT

## 2023-07-29 RX ORDER — SIMETHICONE 80 MG/1
1 TABLET, CHEWABLE ORAL
Qty: 0 | Refills: 0 | DISCHARGE
Start: 2023-07-29

## 2023-07-29 RX ORDER — ACETAMINOPHEN 500 MG
3 TABLET ORAL
Qty: 0 | Refills: 0 | DISCHARGE

## 2023-07-29 RX ORDER — IBUPROFEN 200 MG
1 TABLET ORAL
Qty: 0 | Refills: 0 | DISCHARGE

## 2023-07-29 RX ORDER — IBUPROFEN 200 MG
1 TABLET ORAL
Qty: 40 | Refills: 0
Start: 2023-07-29 | End: 2023-08-07

## 2023-07-29 RX ADMIN — Medication 975 MILLIGRAM(S): at 01:03

## 2023-07-29 RX ADMIN — Medication 975 MILLIGRAM(S): at 13:37

## 2023-07-29 RX ADMIN — Medication 975 MILLIGRAM(S): at 06:23

## 2023-07-29 RX ADMIN — SODIUM CHLORIDE 3 MILLILITER(S): 9 INJECTION INTRAMUSCULAR; INTRAVENOUS; SUBCUTANEOUS at 13:07

## 2023-07-29 RX ADMIN — OXYCODONE HYDROCHLORIDE 5 MILLIGRAM(S): 5 TABLET ORAL at 01:47

## 2023-07-29 RX ADMIN — Medication 600 MILLIGRAM(S): at 13:37

## 2023-07-29 RX ADMIN — Medication 600 MILLIGRAM(S): at 06:23

## 2023-07-29 RX ADMIN — SODIUM CHLORIDE 3 MILLILITER(S): 9 INJECTION INTRAMUSCULAR; INTRAVENOUS; SUBCUTANEOUS at 05:18

## 2023-07-29 RX ADMIN — OXYCODONE HYDROCHLORIDE 5 MILLIGRAM(S): 5 TABLET ORAL at 10:43

## 2023-07-29 RX ADMIN — OXYCODONE HYDROCHLORIDE 5 MILLIGRAM(S): 5 TABLET ORAL at 02:47

## 2023-07-29 RX ADMIN — Medication 600 MILLIGRAM(S): at 12:38

## 2023-07-29 RX ADMIN — OXYCODONE HYDROCHLORIDE 5 MILLIGRAM(S): 5 TABLET ORAL at 09:43

## 2023-07-29 RX ADMIN — Medication 975 MILLIGRAM(S): at 12:37

## 2023-07-29 RX ADMIN — Medication 600 MILLIGRAM(S): at 05:24

## 2023-07-29 RX ADMIN — Medication 975 MILLIGRAM(S): at 05:23

## 2023-07-29 RX ADMIN — Medication 975 MILLIGRAM(S): at 00:03

## 2023-07-29 NOTE — PROGRESS NOTE ADULT - SUBJECTIVE AND OBJECTIVE BOX
PA GynOnc Progress Note POD #2    Pt seen, examined at bedside and doing well this am. Pt states mild abdominal pain.  Pt denies fever, chills, chest pain, SOB, nausea, vomiting, lightheadedness, dizziness.  Pt states passing flatus,    T(F): 97.7 (07-29-23 @ 05:23), Max: 98.4 (07-28-23 @ 16:57)  HR: 73 (07-29-23 @ 05:23) (71 - 82)  BP: 105/67 (07-29-23 @ 05:23) (101/62 - 108/68)  RR: 16 (07-29-23 @ 05:23) (15 - 20)  SpO2: 99% (07-29-23 @ 05:23) (97% - 100%)    I&O's Detail    28 Jul 2023 07:01  -  29 Jul 2023 07:00  --------------------------------------------------------  IN:    IV PiggyBack: 1000 mL    Oral Fluid: 240 mL  Total IN: 1240 mL    OUT:    Indwelling Catheter - Urethral (mL): 240 mL    Voided (mL): 1400 mL  Total OUT: 1640 mL    Total NET: -400 mL    MEDICATIONS  (STANDING):  acetaminophen     Tablet .. 975 milliGRAM(s) Oral every 6 hours  enoxaparin Injectable 40 milliGRAM(s) SubCutaneous every 24 hours  ibuprofen  Tablet. 600 milliGRAM(s) Oral every 6 hours  senna 2 Tablet(s) Oral at bedtime  simethicone 80 milliGRAM(s) Chew five times a day  sodium chloride 0.9% lock flush 3 milliLiter(s) IV Push every 8 hours    MEDICATIONS  (PRN):  ondansetron Injectable 4 milliGRAM(s) IV Push every 4 hours PRN Nausea and/or Vomiting  oxyCODONE    IR 2.5 milliGRAM(s) Oral every 6 hours PRN Moderate Pain (4 - 6)  oxyCODONE    IR 5 milliGRAM(s) Oral every 6 hours PRN Severe Pain (7 - 10)      Physical Exam:  Constitutional: WDWN female, appears stated age  Psych: NAD AxOx3  Skin: no breakdowns noted, warm and dry  Chest: s1s2+, RRR, clear to auscultation bilaterally, no w/r/r    Abdomen: softly distended, no guarding, no rebound, + bowel sounds, some tenderness to touch  Incision site: vertical incision clean and dry with dermabond tape intact.  Abdominal binder in place  Extremities: no lower extremity edema or calf tenderness bilaterally; intermittent compression stockings in place     LABS:             11.0   7.59  )-----------( 214      ( 07-29 @ 05:30 )             34.7                11.5   11.90 )-----------( 231      ( 07-28 @ 05:25 )             35.3       07-29    141    |  103    |  7      ----------------------------<  75     4.1     |  22     |  0.56   07-28    139    |  104    |  8      ----------------------------<  89     3.6     |  27     |  0.57     Ca    8.9        29 Jul 2023 05:30  Ca    8.6        28 Jul 2023 05:25  Phos  4.1       07-29  Phos  4.3       07-28  Mg     2.00      07-29  Mg     2.00      07-28    Urinalysis Basic - ( 29 Jul 2023 05:30 )    Color: x / Appearance: x / SG: x / pH: x  Gluc: 75 mg/dL / Ketone: x  / Bili: x / Urobili: x   Blood: x / Protein: x / Nitrite: x   Leuk Esterase: x / RBC: x / WBC x   Sq Epi: x / Non Sq Epi: x / Bacteria: x      CAPILLARY BLOOD GLUCOSE    This 23y female, s/p exploratory laparotomy, right salpingooophorectomy, Frozen->borderline mucinous pt stable    CV: hemodynamically stable, H/H stable  PUL: adequate on RA  GI: tolerating regular diet   :  voiding with adequate output without dysuria  ID: afebrile, WBC stable  DVT prophylaxis: Lovenox, ambulation and SCDs when in bed  Pain Management: controlled on current regimen  d/w attending and fellow morning rounds  -electrolytes stable  -encourage ambulation and OOB to chair  -encourage incentive spirometry  eval for d/c home this afternoon      Radha Bolanos, PAC  #[id number] spectra
R4 Gyn ONC Progress Note POD#1  HD#2    Subjective:   Pt seen and examined at bedside. No events overnight. Patient with abdominal pain with some relief with pain medication.  Patient is not ambulating. Passing flatus. Has not eaten as of yet. Pt denies fever, chills, chest pain, SOB, nausea, vomiting, lightheadedness, dizziness.      Objective:  T(F): 98.3 (07-28-23 @ 05:31), Max: 99.2 (07-27-23 @ 17:02)  HR: 76 (07-28-23 @ 05:31) (73 - 91)  BP: 107/72 (07-28-23 @ 05:31) (97/67 - 125/68)  RR: 16 (07-28-23 @ 05:31) (12 - 18)  SpO2: 98% (07-28-23 @ 05:31) (93% - 100%)  Wt(kg): --  I&O's Summary    27 Jul 2023 07:01  -  28 Jul 2023 06:41  --------------------------------------------------------  IN: 2765 mL / OUT: 2550 mL / NET: 215 mL      CAPILLARY BLOOD GLUCOSE          MEDICATIONS  (STANDING):  acetaminophen   IVPB .. 1000 milliGRAM(s) IV Intermittent every 6 hours  heparin   Injectable 5000 Unit(s) SubCutaneous every 8 hours  ketorolac   Injectable 30 milliGRAM(s) IV Push every 6 hours  lactated ringers. 1000 milliLiter(s) (125 mL/Hr) IV Continuous <Continuous>  senna 2 Tablet(s) Oral at bedtime  sodium chloride 0.9% lock flush 3 milliLiter(s) IV Push every 8 hours    MEDICATIONS  (PRN):  ondansetron Injectable 4 milliGRAM(s) IV Push every 4 hours PRN Nausea and/or Vomiting  oxyCODONE    IR 5 milliGRAM(s) Oral every 6 hours PRN Severe Pain (7 - 10)  oxyCODONE    IR 2.5 milliGRAM(s) Oral every 6 hours PRN Moderate Pain (4 - 6)  simethicone 80 milliGRAM(s) Chew every 6 hours PRN Gas      Physical Exam:  Constitutional: NAD, A+O x3  CV: RR S1S2 no m/r/g  Lungs: CTA b/l, good air flow b/l  Abdomen: soft, non distended, appropriately-tender. no guarding, no rebound, normal bowel sounds  Incision: midline vertical skin incision clean, dry, intact, covered in dermabond prineo  Extremities: no lower extremity edema or calf tenderness bilaterally; venodynes in place    LABS:

## 2023-07-29 NOTE — DISCHARGE NOTE NURSING/CASE MANAGEMENT/SOCIAL WORK - PATIENT PORTAL LINK FT
You can access the FollowMyHealth Patient Portal offered by [facility] by registering at the following website: [email or web address]. By joining [employer]’s FollowMyHealth portal, you will also be able to view your health information using other applications (apps) compatible with our system.

## 2023-07-29 NOTE — DISCHARGE NOTE NURSING/CASE MANAGEMENT/SOCIAL WORK - NSDCPEFALRISK_GEN_ALL_CORE
For information on Fall & Injury Prevention, visit: [email or web address] OR  [email or web address] OR  https://www.cdc.gov/steadi/patient.html

## 2023-07-29 NOTE — PROGRESS NOTE ADULT - ATTENDING COMMENTS
patient is feeling a bit better, trying to void  continue pain meds for pain  tolerated clears  advance diet  lovenox/scds
24 yo exploratory laparotomy, right salpingooophorectomy (IOFS borderline mucinous), doing well and meeting postoperative milestones.    -AVSS, labs reviewed. Afebrile, no leukocytosis. Replete lytes as indicated.  -Hct stable and on lovenox ppx.    -Pain well controlled with oral pain medication regimen.  -Tolerating regular diet, passing flatus and +BM. Abdominal binder in situ.  -Ambulation and IS encouraged.  -Plan for disposition today, given patient meeting postoperative milestones.  -All questions answered to patients satisfaction.    Anastasiia De Guzman MD      Division of Gynecologic Oncology    Department of Obstetrics and Gynecology  [facility]    [facility]

## 2023-07-29 NOTE — DISCHARGE NOTE NURSING/CASE MANAGEMENT/SOCIAL WORK - NSDCPNINST_GEN_ALL_CORE
Call MD for fever greater than 100.4, nausea, vomiting, increased pain, pus drainage from incision, or go to ER.

## 2023-07-29 NOTE — PROGRESS NOTE ADULT - PROBLEM SELECTOR PLAN 1
GYN ONC Fellow Addendum:    Pt seen and examined at bedside. Agree with above. Pain well controlled. Tolerating reg diet. Voiding. Passing flatus. Ambulating    VS reviewed  Labs reviewed    Meeting all post op milestones  Continue current pain regimen  Tolerating reg diet  Encourage ambulation and IS use  Replete lytes prn  DVT ppx: Lovenox, venodynes  Dispo: eval for PM d/c     Shamika Peralta MD
Gyn Onc Fellow Addendum:    Pt seen and examined at bedside on AM rounds. Agree with above.    VS reviewed  Labs reviewed    Hemodynamically stable  PO pain meds   Reg diet   D/c hong, f/u trial of void  Encourage ambulation and IS use  Replete lytes prn  DVT ppx: Zandra, SQH   Dispo: cont inpt care     Denisse Solitario MD

## 2023-08-01 ENCOUNTER — NON-APPOINTMENT (OUTPATIENT)
Age: 23
End: 2023-08-01

## 2023-08-03 LAB
AFP-TM SERPL-MCNC: <1.8 NG/ML
CA 125 (LABCORP): 14.3 U/ML
CANCER AG125 SERPL-ACNC: 13 U/ML
CANCER AG19-9 SERPL-ACNC: <2 U/ML
CEA SERPL-MCNC: 0.9 NG/ML
ESTRADIOL SERPL-MCNC: 36 PG/ML
HCG SERPL-MCNC: <1 MIU/ML
HE4X: 32.6 PMOL/L
INHIBIN B: 71.8 PG/ML
LDH SERPL-CCNC: 146 U/L
POSTMENOPAUSAL ROMA: 0.75
PREMENOPAUSAL ROMA: 0.28
ROMA COMMENT: NORMAL

## 2023-08-03 NOTE — PLAN
[FreeTextEntry1] : Referral to GYN Onc for surgical consultation given characteristics of cyst and family history of ovarian ca  I spent the time noted on the day of this patient encounter preparing for, providing and documenting the above service. I have  counseled and educated the patient on the differential, workup, disease course, and treatment/management plan. Education was provided to the patient during this encounter. All questions and concerns were answered and addressed in detail.  Massiel Sheriff MD

## 2023-08-03 NOTE — HISTORY OF PRESENT ILLNESS
[FreeTextEntry1] : 23  presents today for well woman exam.\par \par LMP: 6/23\par \par POB: denies, virginal\par PGYN: irreg, coming 2x per month, never pap\par PMH: denies\par PSH: denies\par Med: denies\par All: NKMA\par SH: denies\par FH: mother with stage 3 ovarian ca sp rad hyst/chemo\par \par

## 2023-08-11 ENCOUNTER — APPOINTMENT (OUTPATIENT)
Dept: GYNECOLOGIC ONCOLOGY | Facility: CLINIC | Age: 23
End: 2023-08-11
Payer: MEDICAID

## 2023-08-11 VITALS
SYSTOLIC BLOOD PRESSURE: 103 MMHG | TEMPERATURE: 98 F | HEART RATE: 81 BPM | DIASTOLIC BLOOD PRESSURE: 73 MMHG | WEIGHT: 136 LBS

## 2023-08-11 DIAGNOSIS — N83.299 OTHER OVARIAN CYST, UNSPECIFIED SIDE: ICD-10-CM

## 2023-08-11 PROBLEM — Z78.9 OTHER SPECIFIED HEALTH STATUS: Chronic | Status: ACTIVE | Noted: 2023-07-19

## 2023-08-11 LAB
NON-GYNECOLOGICAL CYTOLOGY STUDY: SIGNIFICANT CHANGE UP
SURGICAL PATHOLOGY STUDY: SIGNIFICANT CHANGE UP

## 2023-08-11 PROCEDURE — 99024 POSTOP FOLLOW-UP VISIT: CPT

## 2023-08-11 NOTE — REASON FOR VISIT
[Post Op] : post op visit [de-identified] : 7/27/23 [de-identified] : Examination under anesthesia, right salpingo-oophorectomy through exploratory laparotomy. [de-identified] : Normal bowel function. Normal bladder function. No vaginal bleeding (hasn't gotten her period) or malodorous discharge. No fevers/chills.  Incision without concern. Recovering well.

## 2023-08-11 NOTE — DISCUSSION/SUMMARY
[Clean] : was clean [Dry] : was dry [Intact] : was intact [Erythema] : was not erythematous [Ecchymosis] : was not ecchymotic [Seroma] : had no seroma [Sutures Intact] : had sutures  intact [None] : had no drainage [Normal Skin] : normal appearance [Normal Skin Turgor] : normal skin turgor [Firm] : soft [Tender] : nontender [Rebound] : no rebound tenderness [Guarding] : no guarding [Incisional Hernia] : no incisional hernia [Mass] : no palpable mass [Doing Well] : is doing well [Excellent Pain Control] : has excellent pain control [No Sign of Infection] : is showing no signs of infection [0] : 0 [Reviewed] : reviewed [de-identified] : deferred  [de-identified] : gu/ gi function [de-identified] : postoperative recuperation  [FreeTextEntry1] : Borderline Mucinous Ovarian tumor, 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

## 2023-08-11 NOTE — ASSESSMENT
[FreeTextEntry1] : We discussed the excellent prognosis associated with borderline tumors of the ovary.  No further treatment will be recommended.  We discussed the low risk for recurrence as well as potential signs and symptoms of recurrence.  We discussed the remote possibility of an invasive recurrence  I have recommended continued surveillance in this office and should be seen in 6 months.  With Transvaginal US prior to apt I answered questions regarding her postoperative recuperation which is going well so far.

## 2023-08-14 ENCOUNTER — NON-APPOINTMENT (OUTPATIENT)
Age: 23
End: 2023-08-14

## 2024-02-16 ENCOUNTER — APPOINTMENT (OUTPATIENT)
Dept: GYNECOLOGIC ONCOLOGY | Facility: CLINIC | Age: 24
End: 2024-02-16
Payer: MEDICAID

## 2024-02-16 VITALS
SYSTOLIC BLOOD PRESSURE: 105 MMHG | WEIGHT: 134 LBS | HEIGHT: 69 IN | DIASTOLIC BLOOD PRESSURE: 73 MMHG | HEART RATE: 91 BPM | BODY MASS INDEX: 19.85 KG/M2

## 2024-02-16 DIAGNOSIS — C56.9 MALIGNANT NEOPLASM OF UNSPECIFIED OVARY: ICD-10-CM

## 2024-02-16 PROCEDURE — 99214 OFFICE O/P EST MOD 30 MIN: CPT

## 2024-02-16 NOTE — DISCUSSION/SUMMARY
[FreeTextEntry1] : obtain pelvic sonogram F/u 6 months or sooner prn discussed dermatology evaluation for keloid scar

## 2024-02-16 NOTE — HISTORY OF PRESENT ILLNESS
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  No complaints.  Periods regular with no intermenstrual bleeding.  Not sexually active.  No pelvic or abdominal pain.  Normal BM and urination

## 2024-02-16 NOTE — PHYSICAL EXAM
[Chaperone Present] : A chaperone was present in the examining room during all aspects of the physical examination [Abnormal] : Abdomen: Abnormal [de-identified] : mass palpable inferior to umbilicus [Normal] : Masses/Tenderness: Non-tender, no masses [de-identified] : keloid scar

## 2024-02-17 ENCOUNTER — RESULT REVIEW (OUTPATIENT)
Age: 24
End: 2024-02-17

## 2024-02-17 ENCOUNTER — OUTPATIENT (OUTPATIENT)
Dept: OUTPATIENT SERVICES | Facility: HOSPITAL | Age: 24
LOS: 1 days | End: 2024-02-17
Payer: MEDICAID

## 2024-02-17 ENCOUNTER — APPOINTMENT (OUTPATIENT)
Dept: ULTRASOUND IMAGING | Facility: IMAGING CENTER | Age: 24
End: 2024-02-17
Payer: MEDICAID

## 2024-02-17 DIAGNOSIS — Z00.8 ENCOUNTER FOR OTHER GENERAL EXAMINATION: ICD-10-CM

## 2024-02-17 PROCEDURE — 76856 US EXAM PELVIC COMPLETE: CPT | Mod: 26

## 2024-02-17 PROCEDURE — 76856 US EXAM PELVIC COMPLETE: CPT

## 2024-02-23 ENCOUNTER — APPOINTMENT (OUTPATIENT)
Dept: DERMATOLOGY | Facility: CLINIC | Age: 24
End: 2024-02-23
Payer: MEDICAID

## 2024-02-23 ENCOUNTER — NON-APPOINTMENT (OUTPATIENT)
Age: 24
End: 2024-02-23

## 2024-02-23 VITALS — BODY MASS INDEX: 19.85 KG/M2 | HEIGHT: 69 IN | WEIGHT: 134 LBS

## 2024-02-23 PROCEDURE — 99213 OFFICE O/P EST LOW 20 MIN: CPT | Mod: 25

## 2024-02-23 PROCEDURE — 11900 INJECT SKIN LESIONS </W 7: CPT

## 2024-04-12 ENCOUNTER — APPOINTMENT (OUTPATIENT)
Dept: DERMATOLOGY | Facility: CLINIC | Age: 24
End: 2024-04-12
Payer: MEDICAID

## 2024-04-12 DIAGNOSIS — L91.0 HYPERTROPHIC SCAR: ICD-10-CM

## 2024-04-12 PROCEDURE — 99213 OFFICE O/P EST LOW 20 MIN: CPT | Mod: 25

## 2024-04-12 PROCEDURE — 11900 INJECT SKIN LESIONS </W 7: CPT

## 2024-05-05 ENCOUNTER — EMERGENCY (EMERGENCY)
Facility: HOSPITAL | Age: 24
LOS: 1 days | Discharge: HOME | End: 2024-05-05
Attending: EMERGENCY MEDICINE
Payer: MEDICAID

## 2024-05-05 VITALS
SYSTOLIC BLOOD PRESSURE: 114 MMHG | RESPIRATION RATE: 18 BRPM | DIASTOLIC BLOOD PRESSURE: 81 MMHG | HEART RATE: 77 BPM | WEIGHT: 130.07 LBS | TEMPERATURE: 98 F | HEIGHT: 69 IN | OXYGEN SATURATION: 100 %

## 2024-05-05 LAB
ALBUMIN SERPL ELPH-MCNC: 3.9 G/DL — SIGNIFICANT CHANGE UP (ref 3.3–5)
ALP SERPL-CCNC: 70 U/L — SIGNIFICANT CHANGE UP (ref 40–120)
ALT FLD-CCNC: 6 U/L — LOW (ref 10–45)
ANION GAP SERPL CALC-SCNC: 13 MMOL/L — SIGNIFICANT CHANGE UP (ref 5–17)
AST SERPL-CCNC: 11 U/L — SIGNIFICANT CHANGE UP (ref 10–40)
BASE EXCESS BLDV CALC-SCNC: 2.8 MMOL/L — SIGNIFICANT CHANGE UP (ref -2–3)
BASOPHILS # BLD AUTO: 0.03 K/UL — SIGNIFICANT CHANGE UP (ref 0–0.2)
BASOPHILS NFR BLD AUTO: 0.3 % — SIGNIFICANT CHANGE UP (ref 0–2)
BILIRUB SERPL-MCNC: 0.2 MG/DL — SIGNIFICANT CHANGE UP (ref 0.2–1.2)
BUN SERPL-MCNC: 13 MG/DL — SIGNIFICANT CHANGE UP (ref 7–23)
CA-I SERPL-SCNC: 1.23 MMOL/L — SIGNIFICANT CHANGE UP (ref 1.15–1.33)
CALCIUM SERPL-MCNC: 9.2 MG/DL — SIGNIFICANT CHANGE UP (ref 8.4–10.5)
CHLORIDE BLDV-SCNC: 102 MMOL/L — SIGNIFICANT CHANGE UP (ref 96–108)
CHLORIDE SERPL-SCNC: 105 MMOL/L — SIGNIFICANT CHANGE UP (ref 96–108)
CO2 BLDV-SCNC: 28 MMOL/L — HIGH (ref 22–26)
CO2 SERPL-SCNC: 23 MMOL/L — SIGNIFICANT CHANGE UP (ref 22–31)
CREAT SERPL-MCNC: 0.84 MG/DL — SIGNIFICANT CHANGE UP (ref 0.5–1.3)
EGFR: 101 ML/MIN/1.73M2 — SIGNIFICANT CHANGE UP
EOSINOPHIL # BLD AUTO: 0.13 K/UL — SIGNIFICANT CHANGE UP (ref 0–0.5)
EOSINOPHIL NFR BLD AUTO: 1.3 % — SIGNIFICANT CHANGE UP (ref 0–6)
GAS PNL BLDV: 134 MMOL/L — LOW (ref 136–145)
GAS PNL BLDV: SIGNIFICANT CHANGE UP
GLUCOSE BLDV-MCNC: 87 MG/DL — SIGNIFICANT CHANGE UP (ref 70–99)
GLUCOSE SERPL-MCNC: 97 MG/DL — SIGNIFICANT CHANGE UP (ref 70–99)
HCG SERPL-ACNC: <2 MIU/ML — SIGNIFICANT CHANGE UP
HCO3 BLDV-SCNC: 27 MMOL/L — SIGNIFICANT CHANGE UP (ref 22–29)
HCT VFR BLD CALC: 37.8 % — SIGNIFICANT CHANGE UP (ref 34.5–45)
HCT VFR BLDA CALC: 39 % — SIGNIFICANT CHANGE UP (ref 34.5–46.5)
HGB BLD CALC-MCNC: 13 G/DL — SIGNIFICANT CHANGE UP (ref 11.7–16.1)
HGB BLD-MCNC: 12.7 G/DL — SIGNIFICANT CHANGE UP (ref 11.5–15.5)
IMM GRANULOCYTES NFR BLD AUTO: 0.4 % — SIGNIFICANT CHANGE UP (ref 0–0.9)
LACTATE BLDV-MCNC: 1.5 MMOL/L — SIGNIFICANT CHANGE UP (ref 0.5–2)
LIDOCAIN IGE QN: 26 U/L — SIGNIFICANT CHANGE UP (ref 7–60)
LYMPHOCYTES # BLD AUTO: 2.21 K/UL — SIGNIFICANT CHANGE UP (ref 1–3.3)
LYMPHOCYTES # BLD AUTO: 22.8 % — SIGNIFICANT CHANGE UP (ref 13–44)
MCHC RBC-ENTMCNC: 31.3 PG — SIGNIFICANT CHANGE UP (ref 27–34)
MCHC RBC-ENTMCNC: 33.6 GM/DL — SIGNIFICANT CHANGE UP (ref 32–36)
MCV RBC AUTO: 93.1 FL — SIGNIFICANT CHANGE UP (ref 80–100)
MONOCYTES # BLD AUTO: 0.78 K/UL — SIGNIFICANT CHANGE UP (ref 0–0.9)
MONOCYTES NFR BLD AUTO: 8 % — SIGNIFICANT CHANGE UP (ref 2–14)
NEUTROPHILS # BLD AUTO: 6.5 K/UL — SIGNIFICANT CHANGE UP (ref 1.8–7.4)
NEUTROPHILS NFR BLD AUTO: 67.2 % — SIGNIFICANT CHANGE UP (ref 43–77)
NRBC # BLD: 0 /100 WBCS — SIGNIFICANT CHANGE UP (ref 0–0)
PCO2 BLDV: 40 MMHG — SIGNIFICANT CHANGE UP (ref 39–42)
PH BLDV: 7.44 — HIGH (ref 7.32–7.43)
PLATELET # BLD AUTO: 252 K/UL — SIGNIFICANT CHANGE UP (ref 150–400)
PO2 BLDV: 35 MMHG — SIGNIFICANT CHANGE UP (ref 25–45)
POTASSIUM BLDV-SCNC: 3.6 MMOL/L — SIGNIFICANT CHANGE UP (ref 3.5–5.1)
POTASSIUM SERPL-MCNC: 3.8 MMOL/L — SIGNIFICANT CHANGE UP (ref 3.5–5.3)
POTASSIUM SERPL-SCNC: 3.8 MMOL/L — SIGNIFICANT CHANGE UP (ref 3.5–5.3)
PROT SERPL-MCNC: 7 G/DL — SIGNIFICANT CHANGE UP (ref 6–8.3)
RBC # BLD: 4.06 M/UL — SIGNIFICANT CHANGE UP (ref 3.8–5.2)
RBC # FLD: 12.2 % — SIGNIFICANT CHANGE UP (ref 10.3–14.5)
SAO2 % BLDV: 59.5 % — LOW (ref 67–88)
SODIUM SERPL-SCNC: 141 MMOL/L — SIGNIFICANT CHANGE UP (ref 135–145)
WBC # BLD: 9.69 K/UL — SIGNIFICANT CHANGE UP (ref 3.8–10.5)
WBC # FLD AUTO: 9.69 K/UL — SIGNIFICANT CHANGE UP (ref 3.8–10.5)

## 2024-05-05 PROCEDURE — 99285 EMERGENCY DEPT VISIT HI MDM: CPT

## 2024-05-05 PROCEDURE — 93975 VASCULAR STUDY: CPT | Mod: 26

## 2024-05-05 PROCEDURE — 74177 CT ABD & PELVIS W/CONTRAST: CPT | Mod: 26,MC

## 2024-05-05 PROCEDURE — 76856 US EXAM PELVIC COMPLETE: CPT | Mod: 26,59

## 2024-05-05 RX ORDER — SODIUM CHLORIDE 9 MG/ML
1000 INJECTION INTRAMUSCULAR; INTRAVENOUS; SUBCUTANEOUS ONCE
Refills: 0 | Status: COMPLETED | OUTPATIENT
Start: 2024-05-05 | End: 2024-05-05

## 2024-05-05 RX ORDER — MORPHINE SULFATE 50 MG/1
4 CAPSULE, EXTENDED RELEASE ORAL ONCE
Refills: 0 | Status: DISCONTINUED | OUTPATIENT
Start: 2024-05-05 | End: 2024-05-05

## 2024-05-05 RX ADMIN — SODIUM CHLORIDE 1000 MILLILITER(S): 9 INJECTION INTRAMUSCULAR; INTRAVENOUS; SUBCUTANEOUS at 22:18

## 2024-05-05 RX ADMIN — MORPHINE SULFATE 4 MILLIGRAM(S): 50 CAPSULE, EXTENDED RELEASE ORAL at 22:18

## 2024-05-05 NOTE — ED PROVIDER NOTE - PATIENT PORTAL LINK FT
You can access the FollowMyHealth Patient Portal offered by [facility] by registering at the following website: [email or web address]. By joining [facility]’s FollowMyHealth portal, you will also be able to view your health information using other applications (apps) compatible with our system.

## 2024-05-05 NOTE — ED PROVIDER NOTE - CLINICAL SUMMARY MEDICAL DECISION MAKING FREE TEXT BOX
Laura Oreilly MD  24-year-old female with past medical history of an ovarian mass that was removed  with removal of the right fallopian tube  as well presents to the emergency department with sudden onset this evening of right lower quadrant tenderness. The patient reports no fever no chills no pain on urination no burning no flank pain. The patient denies being sexually active and prefers to have a transabdominal pelvic ultrasound at the end intracavitary ultrasound. on exam, significant right lower quadrant tenderness, concern for ovarian pathology, kidney stone, the timing less likely but r/o appendicitis, PLan: labs, CT, US, UA, IV fluids, pain meds, reassess. Laura Oreilly MD  24-year-old female with past medical history of an ovarian mass that was removed  with removal of the right fallopian tube  as well presents to the emergency department with sudden onset this evening of right lower quadrant tenderness. The patient reports no fever no chills no pain on urination no burning no flank pain. The patient denies being sexually active and prefers to have a transabdominal pelvic ultrasound rather than intracavitary ultrasound. on exam, significant right lower quadrant tenderness, concern for ovarian pathology, kidney stone, the timing less likely but r/o appendicitis, PLan: labs, CT, US, UA, IV fluids, pain meds, reassess.

## 2024-05-05 NOTE — ED PROVIDER NOTE - OBJECTIVE STATEMENT
Laura Oreilly MD  24-year-old female with past medical history of an ovarian mass that was removed  with removal of the right fallopian tube  as well presents to the emergency department with sudden onset this evening of right lower quadrant tenderness. The patient reports no fever no chills no pain on urination no burning no flank pain. The patient denies being sexually active and prefers to have a transabdominal pelvic ultrasound at the end intracavitary ultrasound. Laura Oreilly MD  24-year-old female with past medical history of an ovarian mass that was removed  with removal of the right fallopian tube  as well presents to the emergency department with sudden onset this evening of right lower quadrant tenderness. The patient reports no fever no chills no pain on urination no burning no flank pain. The patient denies being sexually active and prefers to have a transabdominal pelvic ultrasound rather than intracavitary ultrasound.

## 2024-05-05 NOTE — ED PROVIDER NOTE - PROGRESS NOTE DETAILS
Saint Sloan, DO (PGY1): patient states pain is controlled after morphine. CT scan showing Unusual configuration of small bowel in the right abdomen suspicious for transmesenteric internal (retrocecal) hernia containing small bowel loops. Patient updated. Surgery consulted. Awaiting callback Saint Sloan, DO (PGY1): surgery to come see patient in ED Dr. Bourne:  Patient seen by surgery.  Feel that patient is likely just constipated no concern for obstruction or significant pathology at present.  Recommend p.o. trial and discharge on a bowel regimen. Saint Sloan, DO (PGY1): patient tolerating PO, states she feels well. Will d/c with outpatient f/u and bowel regimen. Patient feels comfortable going home. Return precautions and follow up instructions communicated. All questions answered.

## 2024-05-05 NOTE — ED PROVIDER NOTE - NSFOLLOWUPINSTRUCTIONS_ED_ALL_ED_FT
You were seen in the emergency department today for abdominal pain. Your lab results were non-actionable. You have a moderate amount of stool in your colon. Please take senna or Miralax; you can find them over the counter.    Please follow up with your doctor within 1 week. You may bring copies of your results with you (provided in your discharge paperwork).     You may take 500-1000 mg acetaminophen (Tylenol) every 6 hours, as needed for pain.  You may take 600 mg ibuprofen every 8 hours, with food, as needed for pain.  You can take Tylenol and ibuprofen at the same time.     PLEASE RETURN TO THE EMERGENCY DEPARTMENT if you experience worsening of your symptoms or any of the following: fever, uncontrollable headache, loss of consciousness, chest pain, difficulty breathing, uncontrollable nausea/vomiting, weakness/numbness/tingling.    We hope you feel better! Thank you for trusting us with your care!

## 2024-05-05 NOTE — ED PROVIDER NOTE - ATTENDING CONTRIBUTION TO CARE
I performed a history and physical exam of the patient and discussed their management with the resident. I reviewed the resident's note and agree with the documented findings and plan of care.  Laura Oreilly MD

## 2024-05-06 VITALS
RESPIRATION RATE: 18 BRPM | HEART RATE: 70 BPM | DIASTOLIC BLOOD PRESSURE: 73 MMHG | OXYGEN SATURATION: 98 % | TEMPERATURE: 97 F | SYSTOLIC BLOOD PRESSURE: 117 MMHG

## 2024-05-06 LAB
APPEARANCE UR: CLEAR — SIGNIFICANT CHANGE UP
BACTERIA # UR AUTO: ABNORMAL /HPF
BILIRUB UR-MCNC: NEGATIVE — SIGNIFICANT CHANGE UP
CAST: 0 /LPF — SIGNIFICANT CHANGE UP (ref 0–4)
COLOR SPEC: YELLOW — SIGNIFICANT CHANGE UP
CULTURE RESULTS: SIGNIFICANT CHANGE UP
DIFF PNL FLD: ABNORMAL
GLUCOSE UR QL: NEGATIVE MG/DL — SIGNIFICANT CHANGE UP
KETONES UR-MCNC: NEGATIVE MG/DL — SIGNIFICANT CHANGE UP
LEUKOCYTE ESTERASE UR-ACNC: ABNORMAL
NITRITE UR-MCNC: NEGATIVE — SIGNIFICANT CHANGE UP
PH UR: 7 — SIGNIFICANT CHANGE UP (ref 5–8)
PROT UR-MCNC: NEGATIVE MG/DL — SIGNIFICANT CHANGE UP
RBC CASTS # UR COMP ASSIST: 100 /HPF — HIGH (ref 0–4)
SP GR SPEC: >1.03 — HIGH (ref 1–1.03)
SPECIMEN SOURCE: SIGNIFICANT CHANGE UP
SQUAMOUS # UR AUTO: 2 /HPF — SIGNIFICANT CHANGE UP (ref 0–5)
UROBILINOGEN FLD QL: 0.2 MG/DL — SIGNIFICANT CHANGE UP (ref 0.2–1)
WBC UR QL: 4 /HPF — SIGNIFICANT CHANGE UP (ref 0–5)

## 2024-05-06 PROCEDURE — 84295 ASSAY OF SERUM SODIUM: CPT

## 2024-05-06 PROCEDURE — 80053 COMPREHEN METABOLIC PANEL: CPT

## 2024-05-06 PROCEDURE — 84702 CHORIONIC GONADOTROPIN TEST: CPT

## 2024-05-06 PROCEDURE — 82435 ASSAY OF BLOOD CHLORIDE: CPT

## 2024-05-06 PROCEDURE — 82803 BLOOD GASES ANY COMBINATION: CPT

## 2024-05-06 PROCEDURE — 87086 URINE CULTURE/COLONY COUNT: CPT

## 2024-05-06 PROCEDURE — 36000 PLACE NEEDLE IN VEIN: CPT | Mod: XU

## 2024-05-06 PROCEDURE — 81001 URINALYSIS AUTO W/SCOPE: CPT

## 2024-05-06 PROCEDURE — 99285 EMERGENCY DEPT VISIT HI MDM: CPT | Mod: 25

## 2024-05-06 PROCEDURE — 85014 HEMATOCRIT: CPT

## 2024-05-06 PROCEDURE — 85018 HEMOGLOBIN: CPT

## 2024-05-06 PROCEDURE — 82330 ASSAY OF CALCIUM: CPT

## 2024-05-06 PROCEDURE — 82947 ASSAY GLUCOSE BLOOD QUANT: CPT

## 2024-05-06 PROCEDURE — 84132 ASSAY OF SERUM POTASSIUM: CPT

## 2024-05-06 PROCEDURE — 83605 ASSAY OF LACTIC ACID: CPT

## 2024-05-06 PROCEDURE — 93975 VASCULAR STUDY: CPT

## 2024-05-06 PROCEDURE — 93005 ELECTROCARDIOGRAM TRACING: CPT

## 2024-05-06 PROCEDURE — 74177 CT ABD & PELVIS W/CONTRAST: CPT | Mod: MC

## 2024-05-06 PROCEDURE — 76856 US EXAM PELVIC COMPLETE: CPT

## 2024-05-06 PROCEDURE — 83690 ASSAY OF LIPASE: CPT

## 2024-05-06 PROCEDURE — 85025 COMPLETE CBC W/AUTO DIFF WBC: CPT

## 2024-05-06 RX ADMIN — Medication 1 DROP(S): at 03:23

## 2024-05-06 NOTE — CONSULT NOTE ADULT - ASSESSMENT
24F with no PMHx and PSHx open right salpingoophorectomy 7/2023 for large cyst presents with acute midabdomen pain. Pt reports tolerating dinner. Benign abd. CT showing unusual configuration of small bowel in the right abdomen suspicious for transmesenteric internal (retrocecal) hernia containing small bowel loops.    PLAN  - No surgical intervention  - May recommend bowel regimen given stool burden  - PO challenge    Discussed with Dr. Khan    Acute Care Surgery  [id number]

## 2024-05-06 NOTE — CONSULT NOTE ADULT - SUBJECTIVE AND OBJECTIVE BOX
GENERAL SURGERY CONSULT  24F with no PMHx and PSHx open right salpingoophorectomy 7/2023 for large cyst presents with acute midabdomen pain. Pt reports tolerating dinner. Pt denies nausea, vomiting, CP, SOB, distension, constipation, dysuria.      PAST MEDICAL & SURGICAL HISTORY:  No pertinent past medical history      No significant past surgical history          MEDICATIONS  (STANDING):    MEDICATIONS  (PRN):      Allergies    No Known Allergies    Intolerances        SOCIAL HISTORY:    FAMILY HISTORY:  FH: ovarian cancer (Mother)    FH: thyroid cancer (Father)            Physical Exam:  General: NAD, resting comfortably  HEENT: NC/AT, EOMI, normal hearing  Pulmonary: normal resp effort, patent airway  Abdominal: soft, midline incision hypertrophic, nontender  Extremities: WWP, normal strength  Neuro: A/O x 3, CNs II-XII grossly intact, normal sensation, no focal deficits      Vital Signs Last 24 Hrs  T(C): 36.6 (06 May 2024 03:20), Max: 36.7 (05 May 2024 20:59)  T(F): 97.9 (06 May 2024 03:20), Max: 98 (05 May 2024 20:59)  HR: 68 (06 May 2024 03:20) (68 - 77)  BP: 116/75 (06 May 2024 03:20) (114/81 - 121/84)  BP(mean): --  RR: 18 (06 May 2024 03:20) (18 - 18)  SpO2: 98% (06 May 2024 03:20) (98% - 100%)    Parameters below as of 06 May 2024 03:20  Patient On (Oxygen Delivery Method): room air        I&O's Summary          LABS:                        12.7   9.69  )-----------( 252      ( 05 May 2024 22:27 )             37.8     05-05    141  |  105  |  13  ----------------------------<  97  3.8   |  23  |  0.84    Ca    9.2      05 May 2024 22:27    TPro  7.0  /  Alb  3.9  /  TBili  0.2  /  DBili  x   /  AST  11  /  ALT  6<L>  /  AlkPhos  70  05-05      Urinalysis Basic - ( 06 May 2024 01:41 )    Color: Yellow / Appearance: Clear / SG: >1.030 / pH: x  Gluc: x / Ketone: Negative mg/dL  / Bili: Negative / Urobili: 0.2 mg/dL   Blood: x / Protein: Negative mg/dL / Nitrite: Negative   Leuk Esterase: Trace / RBC: 100 /HPF / WBC 4 /HPF   Sq Epi: x / Non Sq Epi: 2 /HPF / Bacteria: Occasional /HPF      LIVER FUNCTIONS - ( 05 May 2024 22:27 )  Alb: 3.9 g/dL / Pro: 7.0 g/dL / ALK PHOS: 70 U/L / ALT: 6 U/L / AST: 11 U/L / GGT: x             RADIOLOGY & ADDITIONAL STUDIES:  < from: CT Abdomen and Pelvis w/ IV Cont (05.05.24 @ 22:40) >  ACC: [id number] EXAM:  CT ABDOMEN AND PELVIS IC   ORDERED BY:  LEYDRIC[facility]     PROCEDURE DATE:  05/05/2024          INTERPRETATION:  CLINICAL INFORMATION: 24-year-old female with past   medical history of an 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    COMPARISON: Pelvic ultrasound 2/17/2024.    CONTRAST/COMPLICATIONS:  IV Contrast: Omnipaque 350  90 cc administered   10 cc discarded  Oral Contrast: NONE  Complications: None reported at time of study completion    PROCEDURE:  CT of the Abdomen and Pelvis was performed.  Sagittal and coronal reformats were performed.    FINDINGS:  LOWER CHEST: Within normal limits.    LIVER: Subcentimeter hypodensity posterior aspect right lobe not   optimally evaluated. No definitely concerning lesions.  BILE DUCTS: Normal caliber.  GALLBLADDER: Within normal limits.  SPLEEN: Within normal limits.  PANCREAS: Within normal limits.  ADRENALS: Within normal limits.  KIDNEYS/URETERS: Within normal limits.    BLADDER: Within normal limits.  REPRODUCTIVE ORGANS: Uterus and left ovary unremarkable. Status post   right oophorectomy. No suspicious right adnexal lesions.    BOWEL: No bowel obstruction. Appendix is normal. A cluster of bowel loops   with encapsulated configuration with their mesentery extends posterior   and lateral to thececum. No wall thickening of or edema adjacent to   these loops.  PERITONEUM: Trace simple density fluid in the pelvis within physiologic   limits.  VESSELS: Within normal limits.  RETROPERITONEUM/LYMPH NODES: No lymphadenopathy.  ABDOMINAL WALL: Chronic postoperative changes anteriorly.  BONES: Within normal limits.    IMPRESSION:  Unusual configuration of small bowel in the right abdomen suspicious for   transmesenteric internal (retrocecal) hernia containing small bowel   loops. While there isno evidence of obstruction or bowel ischemia,   recommend surgery consult. A hernia in this location could still cause   right-sided pain.    No other etiology for the patient's symptoms identified. Normal appendix.   Status post right oophorectomy with no concerning lesion in the right   adnexa.      < end of copied text >

## 2024-05-06 NOTE — ED ADULT NURSE NOTE - OBJECTIVE STATEMENT
24YF A&OX4 Ambulatory PMHX of R salpingooophorectomy 07/2023 for large cyst presents to the ED c/o abdominal pain since 05/05 evening. pt denies CP, SOB, f/c/n/v/d, urinary symptoms,

## 2024-05-07 NOTE — ED POST DISCHARGE NOTE - RESULT SUMMARY
5/7/24: Incidental/recommended f/u with: CT noted unusual bowel configuration, possible hernia containing small bowel loops, recommended surgical consultation. Chart reviewed, Surgery was consulted regarding this finding. No further ED contact at this time. - Wale Neely PA-C

## 2024-05-10 ENCOUNTER — APPOINTMENT (OUTPATIENT)
Dept: DERMATOLOGY | Facility: CLINIC | Age: 24
End: 2024-05-10

## 2024-06-07 NOTE — H&P PST ADULT - MUSCULOSKELETAL
negative <-- Click to add NO pertinent Family History normal/ROM intact/normal gait/strength 5/5 bilateral upper extremities/strength 5/5 bilateral lower extremities

## 2024-08-05 ENCOUNTER — NON-APPOINTMENT (OUTPATIENT)
Age: 24
End: 2024-08-05

## 2024-08-08 ENCOUNTER — APPOINTMENT (OUTPATIENT)
Dept: DERMATOLOGY | Facility: CLINIC | Age: 24
End: 2024-08-08

## 2024-08-13 ENCOUNTER — APPOINTMENT (OUTPATIENT)
Dept: DERMATOLOGY | Facility: CLINIC | Age: 24
End: 2024-08-13
Payer: MEDICAID

## 2024-08-13 DIAGNOSIS — L91.0 HYPERTROPHIC SCAR: ICD-10-CM

## 2024-08-13 PROCEDURE — 99203 OFFICE O/P NEW LOW 30 MIN: CPT | Mod: 25

## 2024-08-13 PROCEDURE — 11900 INJECT SKIN LESIONS </W 7: CPT

## 2024-08-13 NOTE — HISTORY OF PRESENT ILLNESS
[FreeTextEntry1] : FU keloid [de-identified] : DENNIS ASCENCIO is a 24 year F presenting today for evaluation of the following:  # Keloid on the lower abdomen since ovarian cyst surgery in July 2023; had ILK 20 x3 with improvement though still notes significant bumpiness at scar site, occasional itch  - sometimes itchy, interested in ILK 4th session today -Also interested in exploring laser cosmetic options

## 2024-08-13 NOTE — ASSESSMENT
[FreeTextEntry1] : # Keloid x 1 lower abdomen We have discussed the nature and course of this condition. I have discussed the goals of therapy with the patient. We have discussed treatment options and expectations from treatment. Discussed continued ILK vs excision vs CO2 laser. -Refer to cosmetic consultation with Dr Olivier  - ILK injected to scar as below   Procedure: Intralesional triamcinolone Injection Solution: 10 mg/mL Kenalog; 4th treatment Total volume injected: 4 mL Number of injection sites: 4 Description of Procedure: The treatment location was broadly cleansed with alcohol antiseptic and allowed to dry. The scar was injected with a 30 gauge 1/2 inch needle and infiltrated in the dermal plane with Kenalog. The total volume of Kenalog injected is noted above and the total number of injection sites is also noted. A sterile bandage was placed over the treatment area and follow-up arrangements were made. The patient tolerated the procedure well.  RTC for cosmetic consult w/ Dr Olivier

## 2024-08-13 NOTE — HISTORY OF PRESENT ILLNESS
[FreeTextEntry1] : FU keloid [de-identified] : DENNIS ASCENCIO is a 24 year F presenting today for evaluation of the following:  # Keloid on the lower abdomen since ovarian cyst surgery in July 2023; had ILK 20 x3 with improvement though still notes significant bumpiness at scar site, occasional itch  - sometimes itchy, interested in ILK 4th session today -Also interested in exploring laser cosmetic options

## 2024-08-13 NOTE — PHYSICAL EXAM
[Alert] : alert [Oriented x 3] : ~L oriented x 3 [Well Nourished] : well nourished [FreeTextEntry3] : vertical linear firm hyperpigmented plaque on the lower abdomen; center flattened, with opposing edges more prominent

## 2024-08-16 ENCOUNTER — APPOINTMENT (OUTPATIENT)
Dept: GYNECOLOGIC ONCOLOGY | Facility: CLINIC | Age: 24
End: 2024-08-16
Payer: MEDICAID

## 2024-08-16 VITALS
TEMPERATURE: 98.4 F | BODY MASS INDEX: 19.7 KG/M2 | DIASTOLIC BLOOD PRESSURE: 79 MMHG | WEIGHT: 133 LBS | HEIGHT: 69 IN | HEART RATE: 77 BPM | SYSTOLIC BLOOD PRESSURE: 117 MMHG | OXYGEN SATURATION: 98 %

## 2024-08-16 DIAGNOSIS — C56.9 MALIGNANT NEOPLASM OF UNSPECIFIED OVARY: ICD-10-CM

## 2024-08-16 PROCEDURE — 99213 OFFICE O/P EST LOW 20 MIN: CPT

## 2024-08-16 NOTE — DISCUSSION/SUMMARY
[FreeTextEntry1] : F/u in 6 months or sooner prn Pelvic sonogram to evaluate contralateral ovary Continue f/u with dermatology regarding scar keloid

## 2024-08-16 NOTE — PHYSICAL EXAM
[Chaperone Present] : A chaperone was present in the examining room during all aspects of the physical examination [Normal] : No focal neurologic defects observed [de-identified] : keloid scar

## 2024-08-16 NOTE — PHYSICAL EXAM
[Chaperone Present] : A chaperone was present in the examining room during all aspects of the physical examination [Normal] : No focal neurologic defects observed [de-identified] : keloid scar

## 2024-08-16 NOTE — HISTORY OF PRESENT ILLNESS
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  Not sexually active.  No changes in bowel habits or urination.

## 2024-08-21 ENCOUNTER — APPOINTMENT (OUTPATIENT)
Dept: ULTRASOUND IMAGING | Facility: CLINIC | Age: 24
End: 2024-08-21
Payer: MEDICAID

## 2024-08-21 PROCEDURE — 76856 US EXAM PELVIC COMPLETE: CPT

## 2024-08-24 ENCOUNTER — NON-APPOINTMENT (OUTPATIENT)
Age: 24
End: 2024-08-24

## 2024-11-08 ENCOUNTER — APPOINTMENT (OUTPATIENT)
Dept: DERMATOLOGY | Facility: CLINIC | Age: 24
End: 2024-11-08
Payer: MEDICAID

## 2024-11-08 DIAGNOSIS — L91.0 HYPERTROPHIC SCAR: ICD-10-CM

## 2024-11-08 PROCEDURE — 11900 INJECT SKIN LESIONS </W 7: CPT

## 2024-11-08 PROCEDURE — 99214 OFFICE O/P EST MOD 30 MIN: CPT | Mod: 25

## 2024-12-02 ENCOUNTER — APPOINTMENT (OUTPATIENT)
Dept: DERMATOLOGY | Facility: CLINIC | Age: 24
End: 2024-12-02

## 2024-12-06 ENCOUNTER — NON-APPOINTMENT (OUTPATIENT)
Age: 24
End: 2024-12-06

## 2024-12-06 ENCOUNTER — APPOINTMENT (OUTPATIENT)
Dept: DERMATOLOGY | Facility: CLINIC | Age: 24
End: 2024-12-06
Payer: MEDICAID

## 2024-12-06 DIAGNOSIS — L91.0 HYPERTROPHIC SCAR: ICD-10-CM

## 2024-12-06 PROCEDURE — 11900 INJECT SKIN LESIONS </W 7: CPT

## 2024-12-06 PROCEDURE — 99213 OFFICE O/P EST LOW 20 MIN: CPT | Mod: 25

## 2025-02-07 ENCOUNTER — APPOINTMENT (OUTPATIENT)
Dept: GYNECOLOGIC ONCOLOGY | Facility: CLINIC | Age: 25
End: 2025-02-07
Payer: COMMERCIAL

## 2025-02-07 ENCOUNTER — NON-APPOINTMENT (OUTPATIENT)
Age: 25
End: 2025-02-07

## 2025-02-07 VITALS
OXYGEN SATURATION: 97 % | HEART RATE: 89 BPM | HEIGHT: 69 IN | WEIGHT: 139 LBS | SYSTOLIC BLOOD PRESSURE: 115 MMHG | BODY MASS INDEX: 20.59 KG/M2 | TEMPERATURE: 98.2 F | DIASTOLIC BLOOD PRESSURE: 73 MMHG

## 2025-02-07 DIAGNOSIS — N83.299 OTHER OVARIAN CYST, UNSPECIFIED SIDE: ICD-10-CM

## 2025-02-07 DIAGNOSIS — C56.9 MALIGNANT NEOPLASM OF UNSPECIFIED OVARY: ICD-10-CM

## 2025-02-07 PROCEDURE — 99214 OFFICE O/P EST MOD 30 MIN: CPT

## 2025-03-27 ENCOUNTER — APPOINTMENT (OUTPATIENT)
Dept: DERMATOLOGY | Facility: CLINIC | Age: 25
End: 2025-03-27
Payer: SELF-PAY

## 2025-03-27 DIAGNOSIS — L91.0 HYPERTROPHIC SCAR: ICD-10-CM

## 2025-03-27 PROCEDURE — D0101: CPT

## 2025-05-01 ENCOUNTER — NON-APPOINTMENT (OUTPATIENT)
Age: 25
End: 2025-05-01

## 2025-05-01 ENCOUNTER — APPOINTMENT (OUTPATIENT)
Dept: DERMATOLOGY | Facility: CLINIC | Age: 25
End: 2025-05-01
Payer: COMMERCIAL

## 2025-05-01 DIAGNOSIS — L70.0 ACNE VULGARIS: ICD-10-CM

## 2025-05-01 DIAGNOSIS — L91.0 HYPERTROPHIC SCAR: ICD-10-CM

## 2025-05-01 PROCEDURE — 99214 OFFICE O/P EST MOD 30 MIN: CPT | Mod: 25

## 2025-05-01 PROCEDURE — 11900 INJECT SKIN LESIONS </W 7: CPT

## 2025-05-01 RX ORDER — TRETINOIN 0.5 MG/G
0.05 CREAM TOPICAL
Qty: 1 | Refills: 2 | Status: ACTIVE | COMMUNITY
Start: 2025-05-01 | End: 1900-01-01

## 2025-06-13 ENCOUNTER — APPOINTMENT (OUTPATIENT)
Dept: DERMATOLOGY | Facility: CLINIC | Age: 25
End: 2025-06-13

## 2025-07-03 ENCOUNTER — APPOINTMENT (OUTPATIENT)
Dept: ULTRASOUND IMAGING | Facility: CLINIC | Age: 25
End: 2025-07-03
Payer: COMMERCIAL

## 2025-07-03 PROCEDURE — 76856 US EXAM PELVIC COMPLETE: CPT

## (undated) DEVICE — LABELS BLANK W PEN

## (undated) DEVICE — ELCTR BALL LLETZ LG 5MM

## (undated) DEVICE — GOWN LG

## (undated) DEVICE — GLV 5.5 PROTEXIS (WHITE)

## (undated) DEVICE — SYR LUER LOK 10CC

## (undated) DEVICE — VISITEC 4X4

## (undated) DEVICE — SUT SILK 2-0 18" FS

## (undated) DEVICE — MARKING PEN W RULER

## (undated) DEVICE — DRSG PAD SANITARY OB

## (undated) DEVICE — POSITIONER STRAP ARMBOARD VELCRO TS-30

## (undated) DEVICE — BASIN SET SINGLE

## (undated) DEVICE — POSITIONER PINK PAD PIGAZZI SYSTEM

## (undated) DEVICE — TUBING SUCTION NONCONDUCTIVE 6MM X 12FT

## (undated) DEVICE — CONNECTOR 5 IN1 SUCTION TUBING

## (undated) DEVICE — TRAY MINOR SURG

## (undated) DEVICE — ELCTR BOVIE PENCIL SMOKE EVACUATION

## (undated) DEVICE — BLADE SURGICAL #15 CARBON

## (undated) DEVICE — SUT VICRYL 2-0 27" CT-2 UNDYED

## (undated) DEVICE — PREP BETADINE SPONGE STICKS

## (undated) DEVICE — ELCTR BOVIE PENCIL BLADE 10FT

## (undated) DEVICE — DRSG TELFA 3 X 8

## (undated) DEVICE — SOL IRR POUR H2O 500ML

## (undated) DEVICE — DRAPE TOWEL BLUE 17" X 24"

## (undated) DEVICE — PROTECTOR HEEL / ELBOW FLUFFY

## (undated) DEVICE — BLADE SURGICAL #11 CARBON

## (undated) DEVICE — APPLICATOR Q TIP 6" WOOD STEM

## (undated) DEVICE — GLV 6 PROTEXIS (WHITE)

## (undated) DEVICE — LIGASURE IMPACT

## (undated) DEVICE — VENODYNE/SCD SLEEVE CALF MEDIUM

## (undated) DEVICE — NDL HYPO REGULAR BEVEL 25G X 1.5" (BLUE)

## (undated) DEVICE — PACK PERI GYN

## (undated) DEVICE — PACK GENERAL MINOR